# Patient Record
Sex: MALE | Race: WHITE | NOT HISPANIC OR LATINO | Employment: UNEMPLOYED | URBAN - METROPOLITAN AREA
[De-identification: names, ages, dates, MRNs, and addresses within clinical notes are randomized per-mention and may not be internally consistent; named-entity substitution may affect disease eponyms.]

---

## 2018-05-07 ENCOUNTER — OFFICE VISIT (OUTPATIENT)
Dept: URGENT CARE | Facility: URGENT CARE | Age: 38
End: 2018-05-07
Payer: MEDICAID

## 2018-05-07 VITALS
RESPIRATION RATE: 18 BRPM | HEIGHT: 75 IN | SYSTOLIC BLOOD PRESSURE: 120 MMHG | HEART RATE: 66 BPM | TEMPERATURE: 97.6 F | BODY MASS INDEX: 22.85 KG/M2 | DIASTOLIC BLOOD PRESSURE: 78 MMHG | OXYGEN SATURATION: 98 % | WEIGHT: 183.8 LBS

## 2018-05-07 DIAGNOSIS — F42.9 OBSESSIVE-COMPULSIVE DISORDER, UNSPECIFIED TYPE: ICD-10-CM

## 2018-05-07 DIAGNOSIS — F41.9 ANXIETY: ICD-10-CM

## 2018-05-07 DIAGNOSIS — F32.A DEPRESSION, UNSPECIFIED DEPRESSION TYPE: Primary | ICD-10-CM

## 2018-05-07 PROCEDURE — 99214 OFFICE O/P EST MOD 30 MIN: CPT | Performed by: FAMILY MEDICINE

## 2018-05-07 NOTE — MR AVS SNAPSHOT
After Visit Summary   5/7/2018    Sean Guzman    MRN: [id number]           Patient Information     Date Of Birth          1980        Visit Information        Provider Department      5/7/2018 12:55 PM Sean Morel,  [facility]        Today's Diagnoses     Depression, unspecified depression type    -  1    Anxiety        Obsessive-compulsive disorder, unspecified type           Follow-ups after your visit        Additional Services     PSYCHIATRY REFERRAL [facility]       Your provider has referred you to: [facility] for a Primary Care Consultation. Please call [phone] to make an appointment.    Clinic is located at:  [facility], 2nd Floor  [address], [address]  [place] [id number]    Please complete the following questions:    Reason for Referral: depression and anxiety    What specific question is it most critical for you and the patient to have answered?     You have requested a one-time CONSULTATION visit. This means that although we will make recommendations and provide a multi-step plan where appropriate, the Psychiatry Clinic will not provide ongoing care. We expect that as the referring provider, you will continue to see and manage the patient's care primarily, and we will remain available via Epic for any ongoing questions that arise after the initial visit. In rare and unusually complex cases, we may schedule a follow up visit to complete the assessment, but this should also not be seen as taking over the case.    Is this acceptable to you? Yes                  Who to contact     If you have questions or need follow up information about today's clinic visit or your schedule please contact [facility] directly at [phone].  Normal or non-critical lab and imaging results will be communicated to you by MyChart, letter or phone within 4 business days after the  "clinic has received the results. If you do not hear from us within 7 days, please contact the clinic through Backspaces or phone. If you have a critical or abnormal lab result, we will notify you by phone as soon as possible.  Submit refill requests through Backspaces or call your pharmacy and they will forward the refill request to us. Please allow 3 business days for your refill to be completed.          Additional Information About Your Visit        [employer] Information     Backspaces lets you send messages to your doctor, view your test results, renew your prescriptions, schedule appointments and more. To sign up, go to [email or web address] . Click on \"Log in\" on the left side of the screen, which will take you to the Welcome page. Then click on \"Sign up Now\" on the right side of the page.     You will be asked to enter the access code listed below, as well as some personal information. Please follow the directions to create your username and password.     Your access code is: [email or web address]  Expires: 2018  3:06 PM     Your access code will  in 90 days. If you need help or a new code, please call your [facility] clinic or [phone].        Care EveryWhere ID     This is your Care EveryWhere ID. This could be used by other organizations to access your [facility] medical records  [id number]        Your Vitals Were     Pulse Temperature Respirations Height Pulse Oximetry BMI (Body Mass Index)    66 97.6  F (36.4  C) (Oral) 18 6' 3\" (1.905 m) 98% 22.97 kg/m2       Blood Pressure from Last 3 Encounters:   18 120/78   16 102/69    Weight from Last 3 Encounters:   18 183 lb 12.8 oz (83.4 kg)   16 190 lb 8 oz (86.4 kg)              We Performed the Following     PSYCHIATRY REFERRAL [facility]          Today's Medication Changes          These changes are accurate as of 18  3:06 PM.  If you have any questions, ask your nurse or doctor.               Start taking these medicines.        " Dose/Directions    sertraline 50 MG tablet   Commonly known as:  ZOLOFT   Used for:  Depression, unspecified depression type, Anxiety, Obsessive-compulsive disorder, unspecified type        Take 1/2 tablet (25 mg) for 1-2 weeks, then increase to 1 tablet orally daily   Quantity:  30 tablet   Refills:  3            Where to get your medicines      These medications were sent to [facility] [address]  [address], [facility] [address]     Phone:  [phone]     sertraline 50 MG tablet                Primary Care Provider Fax #    Physician No Ref-Primary [phone]       No address on file        Equal Access to Services     [facility] Hospital[facility] : Gisela Breaux, wamaribell padron, qaybta kaalmada markos, kacie gambino . So [facility] [phone].    ATENCIÓN: Si habla español, tiene a gates disposición servicios gratuitos de asistencia lingüística. [facility] [phone].    We comply with applicable federal civil rights laws and [place] laws. We do not discriminate on the basis of race, color, national origin, age, disability, sex, sexual orientation, or gender identity.            Thank you!     Thank you for choosing [facility]  for your care. Our goal is always to provide you with excellent care. Hearing back from our patients is one way we can continue to improve our services. Please take a few minutes to complete the written survey that you may receive in the mail after your visit with us. Thank you!             Your Updated Medication List - Protect others around you: Learn how to safely use, store and throw away your medicines at www.disposemymeds.org.          This list is accurate as of 5/7/18  3:06 PM.  Always use your most recent med list.                   Brand Name Dispense Instructions for use Diagnosis    PROZAC PO           sertraline 50 MG tablet    ZOLOFT    30 tablet    Take 1/2 tablet (25  mg) for 1-2 weeks, then increase to 1 tablet orally daily    Depression, unspecified depression type, Anxiety, Obsessive-compulsive disorder, unspecified type

## 2018-05-10 ENCOUNTER — OFFICE VISIT (OUTPATIENT)
Dept: INTERNAL MEDICINE | Facility: CLINIC | Age: 38
End: 2018-05-10
Payer: MEDICAID

## 2018-05-10 VITALS
TEMPERATURE: 98.2 F | SYSTOLIC BLOOD PRESSURE: 138 MMHG | HEIGHT: 75 IN | DIASTOLIC BLOOD PRESSURE: 60 MMHG | HEART RATE: 70 BPM | WEIGHT: 184 LBS | BODY MASS INDEX: 22.88 KG/M2 | RESPIRATION RATE: 21 BRPM

## 2018-05-10 DIAGNOSIS — F42.9 OBSESSIVE-COMPULSIVE DISORDER, UNSPECIFIED TYPE: Primary | ICD-10-CM

## 2018-05-10 DIAGNOSIS — F32.A ANXIETY AND DEPRESSION: ICD-10-CM

## 2018-05-10 DIAGNOSIS — F41.9 ANXIETY AND DEPRESSION: ICD-10-CM

## 2018-05-10 PROCEDURE — 99214 OFFICE O/P EST MOD 30 MIN: CPT | Performed by: INTERNAL MEDICINE

## 2018-05-10 ASSESSMENT — PAIN SCALES - GENERAL: PAINLEVEL: NO PAIN (0)

## 2018-05-10 NOTE — MR AVS SNAPSHOT
After Visit Summary   5/10/2018    Sean Guzman    MRN: [id number]           Patient Information     Date Of Birth          1980        Visit Information        Provider Department      5/10/2018 2:30 PM Carin Alvarenga MD [facility]        Today's Diagnoses     Obsessive-compulsive disorder, unspecified type    -  1    Anxiety and depression          Care Instructions    Continue Zoloft 25mg daily x 2 weeks then increase to 50mg.    ---    Referrals to psychiatry and therapy provided. They will call you to schedule. Numbers below as well.           Follow-ups after your visit        Additional Services     MENTAL HEALTH REFERRAL  - Adult; Outpatient Treatment; Individual/Couples/Family/Group Therapy/Health Psychology; [facility]: [facility] [phone]; We will contact you to schedule the appointment or please call with any questions       All scheduling is subject to the client's specific insurance plan & benefits, provider/location availability, and provider clinical specialities.  Please arrive 15 minutes early for your first appointment and bring your completed paperwork.    Please be aware that coverage of these services is subject to the terms and limitations of your health insurance plan.  Call member services at your health plan with any benefit or coverage questions.                      MENTAL HEALTH REFERRAL  - Adult; Psychiatry and Medication Management; Psychiatry; [facility]: Psychiatry Clinic [phone]; We will contact you to schedule the appointment or please call with any questions       All scheduling is subject to the client's specific insurance plan & benefits, provider/location availability, and provider clinical specialities.  Please arrive 15 minutes early for your first appointment and bring your completed paperwork.    Please be aware that coverage of these services is subject to the terms and limitations of your health insurance  "plan.  Call member services at your health plan with any benefit or coverage questions.                            Who to contact     If you have questions or need follow up information about today's clinic visit or your schedule please contact [facility] directly at [phone].  Normal or non-critical lab and imaging results will be communicated to you by MyChart, letter or phone within 4 business days after the clinic has received the results. If you do not hear from us within 7 days, please contact the clinic through MyChart or phone. If you have a critical or abnormal lab result, we will notify you by phone as soon as possible.  Submit refill requests through CONWEAVER or call your pharmacy and they will forward the refill request to us. Please allow 3 business days for your refill to be completed.          Additional Information About Your Visit        MyChart Information     CONWEAVER lets you send messages to your doctor, view your test results, renew your prescriptions, schedule appointments and more. To sign up, go to [email or web address] . Click on \"Log in\" on the left side of the screen, which will take you to the Welcome page. Then click on \"Sign up Now\" on the right side of the page.     You will be asked to enter the access code listed below, as well as some personal information. Please follow the directions to create your username and password.     Your access code is: [email or web address]  Expires: 2018  3:06 PM     Your access code will  in 90 days. If you need help or a new code, please call your [facility] or [phone].        Care EveryWhere ID     This is your Care EveryWhere ID. This could be used by other organizations to access your [facility] medical records  [id number]        Your Vitals Were     Pulse Temperature Respirations Height BMI (Body Mass Index)       70 98.2  F (36.8  C) (Oral) 21 6' 3\" (1.905 m) 23 kg/m2        Blood Pressure from Last 3 " Encounters:   05/10/18 138/60   05/07/18 120/78   06/13/16 102/69    Weight from Last 3 Encounters:   05/10/18 184 lb (83.5 kg)   05/07/18 183 lb 12.8 oz (83.4 kg)   06/13/16 190 lb 8 oz (86.4 kg)              We Performed the Following     MENTAL HEALTH REFERRAL  - Adult; Outpatient Treatment; Individual/Couples/Family/Group Therapy/Health Psychology; [facility]: [facility] [phone]; We will contact you to schedule the appointment or please call with any questions     MENTAL HEALTH REFERRAL  - Adult; Psychiatry and Medication Management; Psychiatry; [facility]: Psychiatry Clinic [phone]; We will contact you to schedule the appointment or please call with any questions        Primary Care Provider Fax #    Physician No Ref-Primary [phone]       No address on file        Equal Access to Services     KALPESH GERONIMO : Gisela Breaux, isaac padron, yashira burrows, kacie gambino . So [facility] [phone].    ATENCIÓN: Si habla español, tiene a gates disposición servicios gratuitos de asistencia lingüística. Llame al [phone].    We comply with applicable federal civil rights laws and [place] laws. We do not discriminate on the basis of race, color, national origin, age, disability, sex, sexual orientation, or gender identity.            Thank you!     Thank you for choosing [facility]  for your care. Our goal is always to provide you with excellent care. Hearing back from our patients is one way we can continue to improve our services. Please take a few minutes to complete the written survey that you may receive in the mail after your visit with us. Thank you!             Your Updated Medication List - Protect others around you: Learn how to safely use, store and throw away your medicines at www.disposemymeds.org.          This list is accurate as of 5/10/18  3:01 PM.  Always use your most recent med list.                    Brand Name Dispense Instructions for use Diagnosis    sertraline 50 MG tablet    ZOLOFT    30 tablet    Take 1/2 tablet (25 mg) for 1-2 weeks, then increase to 1 tablet orally daily    Depression, unspecified depression type, Anxiety, Obsessive-compulsive disorder, unspecified type

## 2018-05-10 NOTE — PATIENT INSTRUCTIONS
Continue Zoloft 25mg daily x 2 weeks then increase to 50mg.    ---    Referrals to psychiatry and therapy provided. They will call you to schedule. Numbers below as well.

## 2018-05-11 NOTE — PROGRESS NOTES
"  SUBJECTIVE:                                                      HPI: Sean Guzman is a pleasant 37 year old male who presents with OCD, SURAJ, and MDD:    Accompanied by mother, who is one of our  staff and who assists with history.    History is mildly challenging - patient is at times reticent and nervous and at other times tangential and difficult to interject on and redirect.     Patient has struggled with mood since childhood.     He was on Paxil for many years with good control of symptoms, but this stopped working after awhile.    He was on Prozac for many years too with off and on control of symptoms, but also with off an on side effects. Patient also admits that he self-adjusted his dose regularly (in retrospect he thinks that this may have made his management more challenging).    He has been off of medications for many years now and is struggling. He is a self-proclaimed \"\" and generally shuns medications, but his symptoms seem to be getting worse in spite of his best non-medication efforts.     He has reasonable insight into his struggles. He is not able to live independently, pursue a career path, or manage interpersonal relationships due to his mood symptoms. He is ashamed and embarrassed of his inability to do these things and frequently considers suicide, though not currently suicidal.     Patient was recently started on Zoloft 25mg daily x 2 weeks with an increase to 50mg daily after that. He is very anxious about this medication having read the side effects and researched it. He is not having any side effects currently, but is apprehensive about increasing to 50mg in the near future. Discussed with patient that both 25 and 50mg of Zoloft are considered low doses and that generally much higher doses are needed to help control OCD.     He is interested in meeting with a therapist.    He is interested in meeting with a psychiatrist too - not so much for medication management " "(though he is open to their input) but rather because he thinks he needs an in depth/inpatient assessment to fully understand his daily challenges and mental health struggles.      He has not other known medical problems, but also hasn't seen a PCP in many years.     The medication, allergy, and problem lists have been reviewed and updated as appropriate.       OBJECTIVE:                                                      /60 (BP Location: Left arm, Patient Position: Chair, Cuff Size: Adult Regular)  Pulse 70  Temp 98.2  F (36.8  C) (Oral)  Resp 21  Ht 6' 3\" (1.905 m)  Wt 184 lb (83.5 kg)  BMI 23 kg/m2  Constitutional: well-appearing  Psych: normal judgment and insight; odd affect; recent and remote memory intact      ASSESSMENT/PLAN:                                                      (F42.9) Obsessive-compulsive disorder, unspecified type  (primary encounter diagnosis)  (F41.8) Anxiety and depression  Comment: suspect patient may be also be on the spectrum, though this has never been diagnosed.   Plan:    - continue Zoloft 25mg daily with increase to 50mg daily after 2 weeks.   - referrals placed for both therapy and psychiatry - patient will be contacted to schedule.   - follow-up with me in 4-6 weeks (earlier as needed).     The instructions on the AVS were discussed and explained to the patient. Patient expressed understanding of instructions.    A total of 28 minutes were spent face-to-face with this patient during this encounter and over half of that time was spent on counseling and coordination of care re: above diagnoses and plans of care.     (Chart documentation was completed, in part, with [employer] voice-recognition software. Even though reviewed, some grammatical, spelling, and word errors may remain.)    Carin Alvarenga MD   [address] [address]  T: [phone], F: [phone]    "

## 2018-05-31 NOTE — PROGRESS NOTES
"SUBJECTIVE: Sean Guzman is a 37 year old male presenting with a chief complaint of depression, anxiety and OCD.  Onset of symptoms was week(s) ago.  Course of illness is worsening.    Severity moderate  Current and Associated symptoms: meds in past  Treatment measures tried include None tried currently  Predisposing factors include HX of depression/anxiety.    No past medical history on file.  No Known Allergies  Social History   Substance Use Topics     Smoking status: Former Smoker     Smokeless tobacco: Never Used     Alcohol use No       ROS:  SKIN: no rash  GI: no vomiting    OBJECTIVE:  /78  Pulse 66  Temp 97.6  F (36.4  C) (Oral)  Resp 18  Ht 6' 3\" (1.905 m)  Wt 183 lb 12.8 oz (83.4 kg)  SpO2 98%  BMI 22.97 kg/m2BMTHWRQ APPEARANCE: healthy, alert and no distress  EYES: EOMI,  PERRL, conjunctiva clear  HENT: ear canals and TM's normal.  Nose and mouth without ulcers, erythema or lesions  NECK: supple, nontender, no lymphadenopathy  RESP: lungs clear to auscultation - no rales, rhonchi or wheezes  CV: regular rates and rhythm, normal S1 S2, no murmur noted  NEURO: Normal strength and tone, sensory exam grossly normal,  normal speech and mentation  SKIN: no suspicious lesions or rashes      ICD-10-CM    1. Depression, unspecified depression type F32.9 sertraline (ZOLOFT) 50 MG tablet     PSYCHIATRY REFERRAL [facility]   2. Anxiety F41.9 sertraline (ZOLOFT) 50 MG tablet     PSYCHIATRY REFERRAL [facility]   3. Obsessive-compulsive disorder, unspecified type F42.9 sertraline (ZOLOFT) 50 MG tablet     PSYCHIATRY REFERRAL [facility]       Fluids/Rest, f/u if worse/not any better    "

## 2018-06-21 ENCOUNTER — OFFICE VISIT (OUTPATIENT)
Dept: INTERNAL MEDICINE | Facility: CLINIC | Age: 38
End: 2018-06-21
Payer: MEDICAID

## 2018-06-21 VITALS
TEMPERATURE: 97.9 F | RESPIRATION RATE: 20 BRPM | SYSTOLIC BLOOD PRESSURE: 102 MMHG | BODY MASS INDEX: 23.16 KG/M2 | HEART RATE: 86 BPM | HEIGHT: 75 IN | DIASTOLIC BLOOD PRESSURE: 68 MMHG | WEIGHT: 186.3 LBS

## 2018-06-21 DIAGNOSIS — F42.9 OBSESSIVE-COMPULSIVE DISORDER, UNSPECIFIED TYPE: Primary | ICD-10-CM

## 2018-06-21 DIAGNOSIS — F32.A ANXIETY AND DEPRESSION: ICD-10-CM

## 2018-06-21 DIAGNOSIS — F41.9 ANXIETY AND DEPRESSION: ICD-10-CM

## 2018-06-21 PROCEDURE — 99213 OFFICE O/P EST LOW 20 MIN: CPT | Performed by: INTERNAL MEDICINE

## 2018-06-21 RX ORDER — SERTRALINE HYDROCHLORIDE 100 MG/1
100 TABLET, FILM COATED ORAL DAILY
Qty: 30 TABLET | Refills: 2 | Status: SHIPPED | OUTPATIENT
Start: 2018-06-21 | End: 2018-11-16

## 2018-06-21 ASSESSMENT — ANXIETY QUESTIONNAIRES
7. FEELING AFRAID AS IF SOMETHING AWFUL MIGHT HAPPEN: NOT AT ALL
5. BEING SO RESTLESS THAT IT IS HARD TO SIT STILL: SEVERAL DAYS
2. NOT BEING ABLE TO STOP OR CONTROL WORRYING: SEVERAL DAYS
3. WORRYING TOO MUCH ABOUT DIFFERENT THINGS: SEVERAL DAYS
1. FEELING NERVOUS, ANXIOUS, OR ON EDGE: SEVERAL DAYS
GAD7 TOTAL SCORE: 6
IF YOU CHECKED OFF ANY PROBLEMS ON THIS QUESTIONNAIRE, HOW DIFFICULT HAVE THESE PROBLEMS MADE IT FOR YOU TO DO YOUR WORK, TAKE CARE OF THINGS AT HOME, OR GET ALONG WITH OTHER PEOPLE: VERY DIFFICULT
6. BECOMING EASILY ANNOYED OR IRRITABLE: SEVERAL DAYS

## 2018-06-21 ASSESSMENT — PATIENT HEALTH QUESTIONNAIRE - PHQ9: 5. POOR APPETITE OR OVEREATING: SEVERAL DAYS

## 2018-06-21 NOTE — PROGRESS NOTES
"  SUBJECTIVE:                                                      HPI: Sean Guzman is a pleasant 38 year old male who presents for follow-up:    Was seen 6 weeks ago for debilitating OCD, anxiety, and depression.    Zoloft dose was increased from 25 to 50 mg daily.  Tolerating well no adverse side effects.    Patient is scheduled to meet with therapy next month.      Patient reports significant improvement in OCD, anxiety, depression since increasing the dose.  Admits, though, that he has significant room to improve still.    PHQ 9 and SURAJ 7 scores are 9/27 and 6/21, respectively.    The medication, allergy, and problem lists have been reviewed and updated as appropriate.       OBJECTIVE:                                                      /68  Pulse 86  Temp 97.9  F (36.6  C) (Oral)  Resp 20  Ht 6' 3\" (1.905 m)  Wt 186 lb 4.8 oz (84.5 kg)  BMI 23.29 kg/m2  Constitutional: well-appearing less nervous and agitated the last visit  Psych: normal judgment and insight; normal mood and affect; recent and remote memory intact      ASSESSMENT/PLAN:                                                      (F42.9) Obsessive-compulsive disorder, unspecified type  (primary encounter diagnosis)  (F41.8) Anxiety and depression  Comment: improved on increased dose of Zoloft, but still with room for improvement.  Plan:    - INCREASE Zoloft from 50 to 100 mg daily.   - follow-up in 8 weeks (earlier as needed).     The instructions on the AVS were discussed and explained to the patient. Patient expressed understanding of instructions.    (Chart documentation was completed, in part, with [employer] voice-recognition software. Even though reviewed, some grammatical, spelling, and word errors may remain.)    Carin Alvarenga MD   [address] [address]  T: [phone], F: [phone]    "

## 2018-06-21 NOTE — MR AVS SNAPSHOT
"              After Visit Summary   6/21/2018    Sean Guzman    MRN: [id number]           Patient Information     Date Of Birth          1980        Visit Information        Provider Department      6/21/2018 11:30 AM Carin Alvarenga MD [facility]        Today's Diagnoses     Encounter for medication refill    -  1      Care Instructions    Let's increase Zoloft to 100mg daily.     Follow-up in 8 weeks please (earlier as needed).           Follow-ups after your visit        Your next 10 appointments already scheduled     Jul 16, 2018 11:30 AM CDT   (Arrive by 11:00 AM)   New Visit with LISA Carrillo   [facility]    [address] [address] [phone]              Who to contact     If you have questions or need follow up information about today's clinic visit or your schedule please contact [facility] directly at [phone].  Normal or non-critical lab and imaging results will be communicated to you by MyChart, letter or phone within 4 business days after the clinic has received the results. If you do not hear from us within 7 days, please contact the clinic through MyChart or phone. If you have a critical or abnormal lab result, we will notify you by phone as soon as possible.  Submit refill requests through [employer] or call your pharmacy and they will forward the refill request to us. Please allow 3 business days for your refill to be completed.          Additional Information About Your Visit        Care EveryWhere ID     This is your Care EveryWhere ID. This could be used by other organizations to access your [facility] medical records  [id number]        Your Vitals Were     Pulse Temperature Respirations Height BMI (Body Mass Index)       86 97.9  F (36.6  C) (Oral) 20 6' 3\" (1.905 m) 23.29 kg/m2        Blood Pressure from Last 3 Encounters: "   06/21/18 102/68   05/10/18 138/60   05/07/18 120/78    Weight from Last 3 Encounters:   06/21/18 186 lb 4.8 oz (84.5 kg)   05/10/18 184 lb (83.5 kg)   05/07/18 183 lb 12.8 oz (83.4 kg)              Today, you had the following     No orders found for display         Today's Medication Changes          These changes are accurate as of 6/21/18 11:31 AM.  If you have any questions, ask your nurse or doctor.               Start taking these medicines.        Dose/Directions    sertraline 100 MG tablet   Commonly known as:  ZOLOFT   Used for:  Encounter for medication refill   Started by:  Carin Alvarenga MD        Dose:  100 mg   Take 1 tablet (100 mg) by mouth daily   Quantity:  30 tablet   Refills:  2            Where to get your medicines      These medications were sent to [facility] [address] [address]     Phone:  [phone]     sertraline 100 MG tablet                Primary Care Provider Fax #    Physician No Ref-Primary [phone]       No address on file        Equal Access to Services     RADHA GERONIMO : Hadii yyao acostao Sojana, waaxda luqadaha, qaybta kaalmada adeegyada, kacei gambino . So [facility] [phone].    ATENCIÓN: Si habla español, tiene a gates disposición servicios gratuitos de asistencia lingüística. Llame al [phone].    We comply with applicable federal civil rights laws and [place] laws. We do not discriminate on the basis of race, color, national origin, age, disability, sex, sexual orientation, or gender identity.            Thank you!     Thank you for choosing [facility]  for your care. Our goal is always to provide you with excellent care. Hearing back from our patients is one way we can continue to improve our services. Please take a few minutes to complete the written survey that you may receive in the mail after your visit with us. Thank you!              Your Updated Medication List - Protect others around you: Learn how to safely use, store and throw away your medicines at www.disposemymeds.org.          This list is accurate as of 6/21/18 11:31 AM.  Always use your most recent med list.                   Brand Name Dispense Instructions for use Diagnosis    sertraline 100 MG tablet    ZOLOFT    30 tablet    Take 1 tablet (100 mg) by mouth daily    Encounter for medication refill

## 2018-06-22 ASSESSMENT — ANXIETY QUESTIONNAIRES: GAD7 TOTAL SCORE: 6

## 2018-06-22 ASSESSMENT — PATIENT HEALTH QUESTIONNAIRE - PHQ9: SUM OF ALL RESPONSES TO PHQ QUESTIONS 1-9: 9

## 2018-07-16 ENCOUNTER — OFFICE VISIT (OUTPATIENT)
Dept: BEHAVIORAL HEALTH | Facility: CLINIC | Age: 38
End: 2018-07-16
Attending: INTERNAL MEDICINE
Payer: COMMERCIAL

## 2018-07-16 DIAGNOSIS — F34.1 PERSISTENT DEPRESSIVE DISORDER WITH MIXED FEATURES, CURRENTLY MODERATE: ICD-10-CM

## 2018-07-16 DIAGNOSIS — F42.2 MIXED OBSESSIONAL THOUGHTS AND ACTS: Primary | ICD-10-CM

## 2018-07-16 PROCEDURE — 90791 PSYCH DIAGNOSTIC EVALUATION: CPT | Performed by: SOCIAL WORKER

## 2018-07-16 ASSESSMENT — ANXIETY QUESTIONNAIRES
3. WORRYING TOO MUCH ABOUT DIFFERENT THINGS: SEVERAL DAYS
1. FEELING NERVOUS, ANXIOUS, OR ON EDGE: SEVERAL DAYS
7. FEELING AFRAID AS IF SOMETHING AWFUL MIGHT HAPPEN: SEVERAL DAYS
GAD7 TOTAL SCORE: 7
IF YOU CHECKED OFF ANY PROBLEMS ON THIS QUESTIONNAIRE, HOW DIFFICULT HAVE THESE PROBLEMS MADE IT FOR YOU TO DO YOUR WORK, TAKE CARE OF THINGS AT HOME, OR GET ALONG WITH OTHER PEOPLE: SOMEWHAT DIFFICULT
5. BEING SO RESTLESS THAT IT IS HARD TO SIT STILL: SEVERAL DAYS
6. BECOMING EASILY ANNOYED OR IRRITABLE: SEVERAL DAYS
2. NOT BEING ABLE TO STOP OR CONTROL WORRYING: SEVERAL DAYS

## 2018-07-16 ASSESSMENT — PATIENT HEALTH QUESTIONNAIRE - PHQ9: 5. POOR APPETITE OR OVEREATING: SEVERAL DAYS

## 2018-07-16 NOTE — MR AVS SNAPSHOT
MRN:[id number]                      After Visit Summary   7/16/2018    Sean Guzman    MRN: [id number]           Visit Information        Provider Department      7/16/2018 4:30 PM ValariegurpreetShaylaJeni Erumembervika Weaver [facility] Generic      Your next 10 appointments already scheduled     Jul 24, 2018  8:30 AM CDT   Return Visit with Jeni Erumdemetrio Owen Davila [facility] ([facility])    [address] [place] [address] [id number] [phone]            Aug 16, 2018  1:00 PM CDT   Office Visit with Carin Alvarenga MD   [facility] ([facility])    [address] [place] [address] [id number] [phone]           Bring a current list of meds and any records pertaining to this visit. For Physicals, please bring immunization records and any forms needing to be filled out. Please arrive 10 minutes early to complete paperwork.            Aug 20, 2018  3:30 PM CDT   Return Visit with Jeni Weaver Valariegurpreet [facility] ([facility])    [address] [place] [address] [id number] [phone]              Care EveryWhere ID     This is your Care EveryWhere ID. This could be used by other organizations to access your [facility] medical records  [id number]        Equal Access to Services     KALPESH GERONIMO AH: Hadii aad ku hadasho Soomaali, waaxda luqadaha, qaybta kaalmada adeegyada, kacie levy. So [facility] [phone].    ATENCIÓN: Si habla español, tiene a gates disposición servicios gratuitos de asistencia lingüística. Llame al [phone].    We comply with applicable federal civil rights laws and [place] laws. We do not discriminate on the basis of race, color, national origin, age, disability, sex, sexual orientation, or gender identity.
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
## 2018-07-16 NOTE — Clinical Note
Please be informed that your patient was seen for a diagnostic evaluation on  7/16/18.  The initial DSM-IV diagnosis are OCD, mixed obsessional and compulsive acts and Persistent Depressive Disorder, mixed, moderate.  A follow-up appt is scheduled for 7/24/18 .  The evaluation was completed by MORALES Cheung, LICSOLANGE.

## 2018-07-17 ASSESSMENT — PATIENT HEALTH QUESTIONNAIRE - PHQ9: SUM OF ALL RESPONSES TO PHQ QUESTIONS 1-9: 9

## 2018-07-17 ASSESSMENT — ANXIETY QUESTIONNAIRES: GAD7 TOTAL SCORE: 7

## 2018-07-25 ENCOUNTER — OFFICE VISIT (OUTPATIENT)
Dept: BEHAVIORAL HEALTH | Facility: CLINIC | Age: 38
End: 2018-07-25
Payer: COMMERCIAL

## 2018-07-25 DIAGNOSIS — F42.2 MIXED OBSESSIONAL THOUGHTS AND ACTS: ICD-10-CM

## 2018-07-25 DIAGNOSIS — F41.1 GAD (GENERALIZED ANXIETY DISORDER): ICD-10-CM

## 2018-07-25 DIAGNOSIS — F34.1 PERSISTENT DEPRESSIVE DISORDER WITH MIXED FEATURES, CURRENTLY MODERATE: Primary | ICD-10-CM

## 2018-07-25 PROCEDURE — 90834 PSYTX W PT 45 MINUTES: CPT | Performed by: SOCIAL WORKER

## 2018-07-25 NOTE — PROGRESS NOTES
"                                             Progress Note    Client Name: Sean Guzman  Date: 7/25/2018          Service Type: Individual      Session Start Time: 2:30  Session End Time: 3:30      Session Length: 45     Session #: 2     Attendees: Client attended alone    Treatment Plan Last Reviewed: 7/25/2018   PHQ-9 / SURAJ-7 : 7/16/18 last completed     DATA      Progress Since Last Session (Related to Symptoms / Goals / Homework):   Symptoms: No change reports that levels of OCD/anxiety and depression remain the same    Homework: N/A- discussed treatment goals; client reports that he did get out of the house 5x/ this week      Episode of Care Goals: Satisfactory progress - ACTION (Actively working towards change); Intervened by reinforcing change plan / affirming steps taken     Current / Ongoing Stressors and Concerns:   Client reports that he has been working on getting out of his house.  He brought in his binder and shared more info on his OCD.  Kept reinforcing that this is his diagnosis, but was willing to concede that he does have depression and anxiety as well.  He was asked to described how the OCD shows up in his life- he gave examples of getting up in the morning and trouble with decisionmaking; feeling like he had to fidget with things to get it \"right\" .  Client struggled with identifying thoughts that would get in his way.      Treatment Objective(s) Addressed in This Session:   use cognitive strategies identified in therapy to challenge anxious thoughts       Intervention:   CBT: Introduced cognitive distortions        ASSESSMENT: Current Emotional / Mental Status (status of significant symptoms):   Risk status (Self / Other harm or suicidal ideation)   Client denies current fears or concerns for personal safety.   Client denies current or recent suicidal ideation or behaviors.   Client denies current or recent homicidal ideation or behaviors.   Client denies current or recent self injurious " behavior or ideation.   Client denies other safety concerns.   Client Client reports there has been no change in risk factors since their last session.     Client Client reports there has been no change in protective factors since their last session.     A safety and risk management plan has not been developed at this time, however client was given the after-hours number / 911 should there be a change in any of these risk factors.     Appearance:   Appropriate    Eye Contact:   Poor   Psychomotor Behavior: Restless    Attitude:   Cooperative    Orientation:   All   Speech    Rate / Production: Monotone     Volume:  Normal    Mood:    Anxious    Affect:    Blunted    Thought Content:  Rumination    Thought Form:  Coherent  Logical    Insight:    Poor      Medication Review:   No changes to current psychiatric medication(s)     Medication Compliance:   Yes     Changes in Health Issues:   None reported     Chemical Use Review:   Substance Use: Chemical use reviewed, no active concerns identified      Tobacco Use: No current tobacco use.       Collateral Reports Completed:   Not Applicable    PLAN: (Client Tasks / Therapist Tasks / Other)  1.  Client will read educational material on cognitive distortions and ready to discuss the ones that he engages in.  2.  Call [facility] to be screened for their OCD IOP program.        Jeni Davila, [facility]                                                         ________________________________________________________________________    Treatment Plan    Client's Name: Sean Guzman  YOB: 1980    Date: 7/25/2018     DSM-V Diagnoses: 300.4 (F34.1) Persistent Depressive Disorder, Early onset, 300.02 (F41.1) Generalized Anxiety Disorder or 300.3 (F42) Obsessive Compulsive Disorder  Psychosocial / Contextual Factors: Moderate; Financial, Social, Vocational  WHODAS: 25    Referral / Collaboration:  Was/were discussed and client will  pursue.    Anticipated number of session or this episode of care: 12      MeasurableTreatment Goal(s) related to diagnosis / functional impairment(s)  Goal 1: Client will decrease OCD behaviors as evidenced by self-report and SURAJ-7 scores    I will know I've met my goal when making and decisions and following through, less fidgeting, and not so hard on himself.      Objective #A (Client Action)    Client will use cognitive strategies identified in therapy to challenge anxious thoughts.  Status: New - Date: 7/25/18     Intervention(s)  Therapist will educate client on cognitive distortions and restructuring techniques, then build awareness of them and have client be able to use techniques to defuse from them.    Objective #B  Client will engage in self-care activities and getting out of the house.  Status: New - Date: 7/25/18     Intervention(s)  Therapist will assign tasks and ask client to bring in ideas .    Objective #C  Client will becoming aware of OCD symptoms and learning tools to manage these symptoms.  Status: New - Date: 7/25/18     Intervention(s)  Therapist will assign homework in each session around the OCD diagnosis.      Client has reviewed and agreed to the above plan.      Jeni Davila, [facility]  July 25, 2018

## 2018-07-25 NOTE — MR AVS SNAPSHOT
MRN:[id number]                      After Visit Summary   7/25/2018    Sean Guzman    MRN: [id number]           Visit Information        Provider Department      7/25/2018 2:30 PM Jeni Davila, LISA [facility] Generic      Your next 10 appointments already scheduled     Aug 16, 2018  1:00 PM CDT   Office Visit with Carin Alvarenga MD   [facility] ([facility]    [address] [place] [phone]           Bring a current list of meds and any records pertaining to this visit. For Physicals, please bring immunization records and any forms needing to be filled out. Please arrive 10 minutes early to complete paperwork.            Aug 20, 2018  3:30 PM CDT   Return Visit with LISA Carrillo   [facility] ([facility])    [address] [place] [phone] [phone]              Care EveryWhere ID     This is your Care EveryWhere ID. This could be used by other organizations to access your [place] medical records  [id number]        Equal Access to Services     KALPESH GERONIMO AH: Hadii yayo acostao Sojana, waaxda luqadaha, qaybta kaalmada adejudithyada, kacie levy. So [facility] [phone].    ATENCIÓN: Si habla español, tiene a gates disposición servicios gratuitos de asistencia lingüística. Carlos al [phone].    We comply with applicable federal civil rights laws and [place] laws. We do not discriminate on the basis of race, color, national origin, age, disability, sex, sexual orientation, or gender identity.

## 2018-08-16 ENCOUNTER — OFFICE VISIT (OUTPATIENT)
Dept: INTERNAL MEDICINE | Facility: CLINIC | Age: 38
End: 2018-08-16
Payer: COMMERCIAL

## 2018-08-16 VITALS
OXYGEN SATURATION: 96 % | WEIGHT: 192.5 LBS | DIASTOLIC BLOOD PRESSURE: 72 MMHG | BODY MASS INDEX: 24.06 KG/M2 | HEART RATE: 73 BPM | TEMPERATURE: 97.9 F | SYSTOLIC BLOOD PRESSURE: 104 MMHG | RESPIRATION RATE: 22 BRPM

## 2018-08-16 DIAGNOSIS — F41.9 ANXIETY AND DEPRESSION: ICD-10-CM

## 2018-08-16 DIAGNOSIS — F42.9 OBSESSIVE-COMPULSIVE DISORDER, UNSPECIFIED TYPE: Primary | ICD-10-CM

## 2018-08-16 DIAGNOSIS — F32.A ANXIETY AND DEPRESSION: ICD-10-CM

## 2018-08-16 PROCEDURE — 99213 OFFICE O/P EST LOW 20 MIN: CPT | Performed by: INTERNAL MEDICINE

## 2018-08-16 RX ORDER — SERTRALINE HYDROCHLORIDE 100 MG/1
150 TABLET, FILM COATED ORAL DAILY
Qty: 135 TABLET | Refills: 0 | Status: SHIPPED | OUTPATIENT
Start: 2018-08-16 | End: 2018-11-16

## 2018-08-16 ASSESSMENT — ANXIETY QUESTIONNAIRES
3. WORRYING TOO MUCH ABOUT DIFFERENT THINGS: SEVERAL DAYS
5. BEING SO RESTLESS THAT IT IS HARD TO SIT STILL: SEVERAL DAYS
IF YOU CHECKED OFF ANY PROBLEMS ON THIS QUESTIONNAIRE, HOW DIFFICULT HAVE THESE PROBLEMS MADE IT FOR YOU TO DO YOUR WORK, TAKE CARE OF THINGS AT HOME, OR GET ALONG WITH OTHER PEOPLE: SOMEWHAT DIFFICULT
2. NOT BEING ABLE TO STOP OR CONTROL WORRYING: SEVERAL DAYS
1. FEELING NERVOUS, ANXIOUS, OR ON EDGE: SEVERAL DAYS
GAD7 TOTAL SCORE: 5
6. BECOMING EASILY ANNOYED OR IRRITABLE: NOT AT ALL
7. FEELING AFRAID AS IF SOMETHING AWFUL MIGHT HAPPEN: NOT AT ALL

## 2018-08-16 ASSESSMENT — PATIENT HEALTH QUESTIONNAIRE - PHQ9: 5. POOR APPETITE OR OVEREATING: SEVERAL DAYS

## 2018-08-16 NOTE — PROGRESS NOTES
SUBJECTIVE:                                                      HPI: Sean Guzman is a pleasant 38 year old male who presents with:    Was seen 8 weeks ago for follow-up of debilitating OCD, anxiety, and depression.     Zoloft dose was increased from 50 to 100 mg daily. Tolerating well no adverse side effects.      Patient reports significant improvement in OCD, anxiety, depression since increasing the dose, but still has room to improve. Would like to try an even higher dose.     PHQ-9 and SURAJ-7 scores are 8/27 and  5/21, respectively - minimally improved from last visit.    The medication, allergy, and problem lists have been reviewed and updated as appropriate.       OBJECTIVE:                                                      /72  Pulse 73  Temp 97.9  F (36.6  C) (Oral)  Resp 22  Wt 192 lb 8 oz (87.3 kg)  SpO2 96%  BMI 24.06 kg/m2  Constitutional: well-appearing  Psych: normal judgment and insight; normal mood; recent and remote memory intact      ASSESSMENT/PLAN:                                                      (F42.9) Obsessive-compulsive disorder, unspecified type  (primary encounter diagnosis)  (F41.8) Anxiety and depression  Comment: improved on increased dose of Zoloft, but still with room for improvement.  Plan:    - INCREASE Zoloft from 100 to 150 mg daily.   - follow-up in 8-12 weeks (earlier as needed).     The instructions on the AVS were discussed and explained to the patient. Patient expressed understanding of instructions.    (Chart documentation was completed, in part, with [employer] voice-recognition software. Even though reviewed, some grammatical, spelling, and word errors may remain.)    Carin Alvarenga MD   [address] [address]  T: [phone], F: [phone]

## 2018-08-16 NOTE — PATIENT INSTRUCTIONS
Let's try increasing dose of Zoloft up to 150mg daily - new Rx sent in.     ---    Follow-up in 2-3 months.

## 2018-08-16 NOTE — MR AVS SNAPSHOT
After Visit Summary   8/16/2018    Sean Guzman    MRN: [id number]           Patient Information     Date Of Birth          1980        Visit Information        Provider Department      8/16/2018 1:00 PM Carin Alvarenga MD [facility]        Today's Diagnoses     Obsessive-compulsive disorder, unspecified type    -  1      Care Instructions    Let's try increasing dose of Zoloft up to 150mg daily - new Rx sent in.     ---    Follow-up in 2-3 months.          Follow-ups after your visit        Your next 10 appointments already scheduled     Aug 20, 2018  3:30 PM CDT   Return Visit with Jeni Davila [facility] ([facility])    [address] [address] [phone]              Who to contact     If you have questions or need follow up information about today's clinic visit or your schedule please contact [facility] directly at [phone].  Normal or non-critical lab and imaging results will be communicated to you by MyChart, letter or phone within 4 business days after the clinic has received the results. If you do not hear from us within 7 days, please contact the clinic through MyChart or phone. If you have a critical or abnormal lab result, we will notify you by phone as soon as possible.  Submit refill requests through [employer] or call your pharmacy and they will forward the refill request to us. Please allow 3 business days for your refill to be completed.          Additional Information About Your Visit        Care EveryWhere ID     This is your Care EveryWhere ID. This could be used by other organizations to access your [place] medical records  [id number]        Your Vitals Were     Pulse Temperature Respirations Pulse Oximetry BMI (Body Mass Index)       73 97.9  F (36.6  C) (Oral) 22 96% 24.06 kg/m2        Blood Pressure from Last 3  Encounters:   08/16/18 104/72   06/21/18 102/68   05/10/18 138/60    Weight from Last 3 Encounters:   08/16/18 192 lb 8 oz (87.3 kg)   06/21/18 186 lb 4.8 oz (84.5 kg)   05/10/18 184 lb (83.5 kg)              Today, you had the following     No orders found for display         Today's Medication Changes          These changes are accurate as of 8/16/18  1:14 PM.  If you have any questions, ask your nurse or doctor.               These medicines have changed or have updated prescriptions.        Dose/Directions    * sertraline 100 MG tablet   Commonly known as:  ZOLOFT   This may have changed:  Another medication with the same name was added. Make sure you understand how and when to take each.   Used for:  Obsessive-compulsive disorder, unspecified type, Anxiety and depression   Changed by:  Carin Alvarenga MD        Dose:  100 mg   Take 1 tablet (100 mg) by mouth daily   Quantity:  30 tablet   Refills:  2       * sertraline 100 MG tablet   Commonly known as:  ZOLOFT   This may have changed:  You were already taking a medication with the same name, and this prescription was added. Make sure you understand how and when to take each.   Used for:  Obsessive-compulsive disorder, unspecified type   Changed by:  Carin Alvarenga MD        Dose:  150 mg   Take 1.5 tablets (150 mg) by mouth daily   Quantity:  135 tablet   Refills:  0       * Notice:  This list has 2 medication(s) that are the same as other medications prescribed for you. Read the directions carefully, and ask your doctor or other care provider to review them with you.         Where to get your medicines      These medications were sent to [facility] [address] [address]     Phone:  [phone]     sertraline 100 MG tablet                Primary Care Provider Fax #    Physician No Ref-Primary [phone]       No address on file        Equal Access to Services     KALPESH GERONIMO AH:  Hadii yayo sims Sosaminaali, waaxda luqadaha, qaybta kaalestefani burrows, kacie brettlilia levy. So [facility] [phone].    ATENCIÓN: Si gissell chavez, tiene a gates disposición servicios gratuitos de asistencia lingüística. Nicoleame al [phone].    We comply with applicable federal civil rights laws and [place] laws. We do not discriminate on the basis of race, color, national origin, age, disability, sex, sexual orientation, or gender identity.            Thank you!     Thank you for choosing [facility]  for your care. Our goal is always to provide you with excellent care. Hearing back from our patients is one way we can continue to improve our services. Please take a few minutes to complete the written survey that you may receive in the mail after your visit with us. Thank you!             Your Updated Medication List - Protect others around you: Learn how to safely use, store and throw away your medicines at www.disposemymeds.org.          This list is accurate as of 8/16/18  1:14 PM.  Always use your most recent med list.                   Brand Name Dispense Instructions for use Diagnosis    * sertraline 100 MG tablet    ZOLOFT    30 tablet    Take 1 tablet (100 mg) by mouth daily    Obsessive-compulsive disorder, unspecified type, Anxiety and depression       * sertraline 100 MG tablet    ZOLOFT    135 tablet    Take 1.5 tablets (150 mg) by mouth daily    Obsessive-compulsive disorder, unspecified type       * Notice:  This list has 2 medication(s) that are the same as other medications prescribed for you. Read the directions carefully, and ask your doctor or other care provider to review them with you.

## 2018-08-17 ASSESSMENT — PATIENT HEALTH QUESTIONNAIRE - PHQ9: SUM OF ALL RESPONSES TO PHQ QUESTIONS 1-9: 8

## 2018-08-17 ASSESSMENT — ANXIETY QUESTIONNAIRES: GAD7 TOTAL SCORE: 5

## 2018-08-20 ENCOUNTER — OFFICE VISIT (OUTPATIENT)
Dept: BEHAVIORAL HEALTH | Facility: CLINIC | Age: 38
End: 2018-08-20
Payer: COMMERCIAL

## 2018-08-20 DIAGNOSIS — F34.1 PERSISTENT DEPRESSIVE DISORDER WITH MIXED FEATURES, CURRENTLY MODERATE: ICD-10-CM

## 2018-08-20 DIAGNOSIS — F41.1 GAD (GENERALIZED ANXIETY DISORDER): ICD-10-CM

## 2018-08-20 DIAGNOSIS — F42.2 MIXED OBSESSIONAL THOUGHTS AND ACTS: Primary | ICD-10-CM

## 2018-08-20 PROCEDURE — 90834 PSYTX W PT 45 MINUTES: CPT | Performed by: SOCIAL WORKER

## 2018-08-20 ASSESSMENT — ANXIETY QUESTIONNAIRES
6. BECOMING EASILY ANNOYED OR IRRITABLE: NOT AT ALL
5. BEING SO RESTLESS THAT IT IS HARD TO SIT STILL: SEVERAL DAYS
1. FEELING NERVOUS, ANXIOUS, OR ON EDGE: SEVERAL DAYS
IF YOU CHECKED OFF ANY PROBLEMS ON THIS QUESTIONNAIRE, HOW DIFFICULT HAVE THESE PROBLEMS MADE IT FOR YOU TO DO YOUR WORK, TAKE CARE OF THINGS AT HOME, OR GET ALONG WITH OTHER PEOPLE: SOMEWHAT DIFFICULT
GAD7 TOTAL SCORE: 5
2. NOT BEING ABLE TO STOP OR CONTROL WORRYING: SEVERAL DAYS
7. FEELING AFRAID AS IF SOMETHING AWFUL MIGHT HAPPEN: NOT AT ALL
3. WORRYING TOO MUCH ABOUT DIFFERENT THINGS: SEVERAL DAYS

## 2018-08-20 ASSESSMENT — PATIENT HEALTH QUESTIONNAIRE - PHQ9: 5. POOR APPETITE OR OVEREATING: SEVERAL DAYS

## 2018-08-20 NOTE — PROGRESS NOTES
"                                             Progress Note    Client Name: Sean Guzman  Date: 8/20/2018           Service Type: Individual      Session Start Time: 3:30  Session End Time: 3:30      Session Length: 45     Session #: 3     Attendees: Client attended alone    Treatment Plan Last Reviewed: 7/25/2018   PHQ-9 / SURAJ-7 : 8/20/2018 last completed     DATA      Progress Since Last Session (Related to Symptoms / Goals / Homework):   Symptoms: No change reports that levels of OCD/anxiety and depression remain the same    Homework: Partially completed- has not followed through on Cruz or [employer], read through homework- did not bring it in \"because it may rain on it\"      Episode of Care Goals: Satisfactory progress - ACTION (Actively working towards change); Intervened by reinforcing change plan / affirming steps taken     Current / Ongoing Stressors and Concerns:   Client reports on the following distorted thinking processes being present:  Filtering, Mindreading. Control Fallacy, Personalization, Shoulds, Self Blaming, Emotional Reasoning, and Passive Thinking.  Client has been thinking about volunteering at [employer].  Client decided that he will work on updating his resume- we discussed how he would talk about the time he has not been working (2013).  At first client wanted to not have any appointments until he felt that he has made movements with the goals discussed.  However, writer challenged him with his thinking process- was it anxiety stating that he not have appointments so that he did not have the accountability to have things happen.  He did agree and began taking deep breaths as he worked on calming his anxiety in session.      Treatment Objective(s) Addressed in This Session:   use cognitive strategies identified in therapy to challenge anxious thoughts       Intervention:   CBT: Reviewed cognitive distortions; will work on thought record sheet to build awareness        ASSESSMENT: Current Emotional " / Mental Status (status of significant symptoms):   Risk status (Self / Other harm or suicidal ideation)   Client denies current fears or concerns for personal safety.   Client denies current or recent suicidal ideation or behaviors.   Client denies current or recent homicidal ideation or behaviors.   Client denies current or recent self injurious behavior or ideation.   Client denies other safety concerns.   Client Client reports there has been no change in risk factors since their last session.     Client Client reports there has been no change in protective factors since their last session.     A safety and risk management plan has not been developed at this time, however client was given the after-hours number / 911 should there be a change in any of these risk factors.     Appearance:   Appropriate    Eye Contact:   Poor   Psychomotor Behavior: Restless    Attitude:   Cooperative    Orientation:   All   Speech    Rate / Production: Monotone     Volume:  Normal    Mood:    Anxious    Affect:    Blunted    Thought Content:  Rumination    Thought Form:  Coherent  Logical    Insight:    Poor      Medication Review:   Changes to psychiatric medications, see updated Medication List in EPIC.      Medication Compliance:   Yes     Changes in Health Issues:   None reported     Chemical Use Review:   Substance Use: Chemical use reviewed, no active concerns identified      Tobacco Use: No current tobacco use.       Collateral Reports Completed:   Not Applicable    PLAN: (Client Tasks / Therapist Tasks / Other)  1.  Client will build awareness of distorted thinking with thought record sheets;  2.  Look into resources discussed;  3.  Focus on taking steps towards:  Being independent, being employed, helping others, and having own place.        Jeni Davila, LISA                                                         ________________________________________________________________________    Treatment  Plan    Client's Name: Sean Guzman  YOB: 1980    Date: 7/25/2018     DSM-V Diagnoses: 300.4 (F34.1) Persistent Depressive Disorder, Early onset, 300.02 (F41.1) Generalized Anxiety Disorder or 300.3 (F42) Obsessive Compulsive Disorder  Psychosocial / Contextual Factors: Moderate; Financial, Social, Vocational  WHODAS: 25    Referral / Collaboration:  Was/were discussed and client will pursue.    Anticipated number of session or this episode of care: 12      MeasurableTreatment Goal(s) related to diagnosis / functional impairment(s)  Goal 1: Client will decrease OCD behaviors as evidenced by self-report and SURAJ-7 scores    I will know I've met my goal when making and decisions and following through, less fidgeting, and not so hard on himself.      Objective #A (Client Action)    Client will use cognitive strategies identified in therapy to challenge anxious thoughts.  Status: New - Date: 7/25/18     Intervention(s)  Therapist will educate client on cognitive distortions and restructuring techniques, then build awareness of them and have client be able to use techniques to defuse from them.    Objective #B  Client will engage in self-care activities and getting out of the house.  Status: New - Date: 7/25/18     Intervention(s)  Therapist will assign tasks and ask client to bring in ideas .    Objective #C  Client will becoming aware of OCD symptoms and learning tools to manage these symptoms.  Status: New - Date: 7/25/18     Intervention(s)  Therapist will assign homework in each session around the OCD diagnosis.      Client has reviewed and agreed to the above plan.      Jeni Davila, [facility]  July 25, 2018

## 2018-08-20 NOTE — MR AVS SNAPSHOT
MRN:[id number]                      After Visit Summary   8/20/2018    Sean Guzman    MRN: [id number]           Visit Information        Provider Department      8/20/2018 3:30 PM Jeni Davila LICSW [facility] Generic      Your next 10 appointments already scheduled     Sep 24, 2018 12:30 PM CDT   Return Visit with Jeni Davila [facility] ([facility])    [address] [place] [phone]   [phone]            Oct 29, 2018  2:30 PM CDT   Return Visit with Jeni Davila [facility] ([facility])    [address] [place] [address]   [phone]            Nov 16, 2018  3:15 PM CST   Office Visit with Carin Alvarenga MD   [facility] ([facility]    [address] [place] [phone]           Bring a current list of meds and any records pertaining to this visit. For Physicals, please bring immunization records and any forms needing to be filled out. Please arrive 10 minutes early to complete paperwork.              Care EveryWhere ID     This is your Care EveryWhere ID. This could be used by other organizations to access your [place] medical records  [id number]        Equal Access to Services     KALPESH GERONIMO AH: Hadii aad ku hadasho Sosaminaali, waaxda luqadaha, qaybta kaalmada adeegyada, kacie levy. So [facility] [phone].    ATENCIÓN: Si habla español, tiene a gates disposición servicios gratuitos de asistencia lingüística. Llame al [phone].    We comply with applicable federal civil rights laws and [place] laws. We do not discriminate on the basis of race, color, national origin, age, disability, sex, sexual orientation, or gender identity.

## 2018-08-21 ASSESSMENT — PATIENT HEALTH QUESTIONNAIRE - PHQ9: SUM OF ALL RESPONSES TO PHQ QUESTIONS 1-9: 8

## 2018-08-21 ASSESSMENT — ANXIETY QUESTIONNAIRES: GAD7 TOTAL SCORE: 5

## 2018-09-24 ENCOUNTER — OFFICE VISIT (OUTPATIENT)
Dept: BEHAVIORAL HEALTH | Facility: CLINIC | Age: 38
End: 2018-09-24
Payer: COMMERCIAL

## 2018-09-24 DIAGNOSIS — F34.1 PERSISTENT DEPRESSIVE DISORDER WITH MIXED FEATURES, CURRENTLY MODERATE: Primary | ICD-10-CM

## 2018-09-24 DIAGNOSIS — F41.1 GAD (GENERALIZED ANXIETY DISORDER): ICD-10-CM

## 2018-09-24 DIAGNOSIS — F42.2 MIXED OBSESSIONAL THOUGHTS AND ACTS: ICD-10-CM

## 2018-09-24 PROCEDURE — 90834 PSYTX W PT 45 MINUTES: CPT | Performed by: SOCIAL WORKER

## 2018-09-24 ASSESSMENT — ANXIETY QUESTIONNAIRES
IF YOU CHECKED OFF ANY PROBLEMS ON THIS QUESTIONNAIRE, HOW DIFFICULT HAVE THESE PROBLEMS MADE IT FOR YOU TO DO YOUR WORK, TAKE CARE OF THINGS AT HOME, OR GET ALONG WITH OTHER PEOPLE: SOMEWHAT DIFFICULT
GAD7 TOTAL SCORE: 6
1. FEELING NERVOUS, ANXIOUS, OR ON EDGE: SEVERAL DAYS
7. FEELING AFRAID AS IF SOMETHING AWFUL MIGHT HAPPEN: NOT AT ALL
3. WORRYING TOO MUCH ABOUT DIFFERENT THINGS: SEVERAL DAYS
5. BEING SO RESTLESS THAT IT IS HARD TO SIT STILL: SEVERAL DAYS
2. NOT BEING ABLE TO STOP OR CONTROL WORRYING: SEVERAL DAYS
6. BECOMING EASILY ANNOYED OR IRRITABLE: SEVERAL DAYS

## 2018-09-24 ASSESSMENT — PATIENT HEALTH QUESTIONNAIRE - PHQ9: 5. POOR APPETITE OR OVEREATING: SEVERAL DAYS

## 2018-09-24 NOTE — MR AVS SNAPSHOT
MRN:[id number]                      After Visit Summary   9/24/2018    Sean Guzman    MRN: [id number]           Visit Information        Provider Department      9/24/2018 12:30 PM [facility]        Your next 10 appointments already scheduled     Oct 29, 2018  2:30 PM CDT   Return Visit with Jeni Weaver Giovanni [facility] ([facility])    [address] [place] [phone]            Nov 16, 2018  3:15 PM CST   Office Visit with Carin Alvarenga MD   [facility]    [address] [place] [phone]           Bring a current list of meds and any records pertaining to this visit. For Physicals, please bring immunization records and any forms needing to be filled out. Please arrive 10 minutes early to complete paperwork.            Nov 26, 2018  1:30 PM CST   Return Visit with Jeni Weaver Valariegurpreet [facility] ([facility])    [address] [place] [phone]              Care Instructions    Homework:  1.  Client will walk three times a week;  2. Client will attend [facility] group on Wednesday  3. Client will reach out to his two friends in one month.             Care EveryWhere ID     This is your Care EveryWhere ID. This could be used by other organizations to access your [place] medical records  [id number]        Equal Access to Services     RADHA GERONIMO : Hadii aad ku hadasho Soomaali, waaxda luqadaha, qaybta kaalmada adeegyada, waxelaine ramirez haysharin ramiro gambino . So [facility] [phone].    ATENCIÓN: Si habla español, tiene a gates disposición servicios gratuitos de asistencia lingüística. Llame al [phone].    We comply with  applicable federal civil rights laws and [place] laws. We do not discriminate on the basis of race, color, national origin, age, disability, sex, sexual orientation, or gender identity.

## 2018-09-24 NOTE — PROGRESS NOTES
"                                             Progress Note    Client Name: Sean Guzman  Date: 9/24/2018            Service Type: Individual      Session Start Time: 12:30  Session End Time: 1:30      Session Length: 45     Session #: 4     Attendees: Client attended alone    Treatment Plan Last Reviewed: 7/25/2018   PHQ-9 / SURAJ-7 : 9/24/2018  last completed     DATA      Progress Since Last Session (Related to Symptoms / Goals / Homework):   Symptoms: No change SURAJ-7 and PHQ-9 scores remain about the same from last session    Homework: Did not complete- client continues to struggle- inaction is prominent- tries and stops      Episode of Care Goals: No improvement - PREPARATION (Decided to change - considering how); Intervened by negotiating a change plan and determining options / strategies for behavior change, identifying triggers, exploring social supports, and working towards setting a date to begin behavior change- stuck with wanting but not moving with goals     Current / Ongoing Stressors and Concerns:   Client reports that he continues to adjust with his medications.  Switched medications to the morning- \"to see if it helps me with my energy levels\".  Client reports that he tried to exercise for a day or two and stops.  Much of his time is focused on sleeping, watching tv, or being on the internet.  He did not follow through with any of the resources or homework that we discussed.  He states that he feels \"ready\" to go to [facility] and attend the [facility] group.  Denies that he feels uncomfortable and when pressed as to why there is inaction in his life- he reports that he doesn't feel like it. He reports wanting to follow through with goals of living independently, having a job, and such.  Discussed interacting with his social network- he has two friends but he hasn't called them in \"months\".  Encouraged to call his friends.       Treatment Objective(s) Addressed in This Session:   use cognitive " strategies identified in therapy to challenge anxious thoughts       Intervention:   CBT: Challenged beliefs systems- Attitude around his willingness to change and acceptance around uncomfortability        ASSESSMENT: Current Emotional / Mental Status (status of significant symptoms):   Risk status (Self / Other harm or suicidal ideation)   Client denies current fears or concerns for personal safety.   Client denies current or recent suicidal ideation or behaviors. Passive thoughts when he gets frustrated   Client denies current or recent homicidal ideation or behaviors.   Client denies current or recent self injurious behavior or ideation.   Client denies other safety concerns.   Client Client reports there has been no change in risk factors since their last session.     Client Client reports there has been no change in protective factors since their last session.     A safety and risk management plan has not been developed at this time, however client was given the after-hours number / 911 should there be a change in any of these risk factors.     Appearance:   Appropriate    Eye Contact:   Poor   Psychomotor Behavior: Restless    Attitude:   Cooperative    Orientation:   All   Speech    Rate / Production: Monotone     Volume:  Normal    Mood:    Anxious    Affect:    Blunted    Thought Content:  Rumination    Thought Form:  Coherent  Logical    Insight:    Poor      Medication Review:   No changes to current psychiatric medication(s)     Medication Compliance:   Yes     Changes in Health Issues:   None reported     Chemical Use Review:   Substance Use: Chemical use reviewed, no active concerns identified - One drink every once in a while     Tobacco Use: No current tobacco use.       Collateral Reports Completed:   Not Applicable    PLAN: (Client Tasks / Therapist Tasks / Other)  1.  Client will walk three times a week;  2. Client will attend [facility] orientation group on Wednesday  3. Client will reach out to his  two friends in one month.        Jeni Davila, [facility]                                                         ________________________________________________________________________    Treatment Plan    Client's Name: Sean Guzman  YOB: 1980    Date: 7/25/2018     DSM-V Diagnoses: 300.4 (F34.1) Persistent Depressive Disorder, Early onset, 300.02 (F41.1) Generalized Anxiety Disorder or 300.3 (F42) Obsessive Compulsive Disorder  Psychosocial / Contextual Factors: Moderate; Financial, Social, Vocational  WHODAS: 25    Referral / Collaboration:  Was/were discussed and client will pursue.    Anticipated number of session or this episode of care: 12      MeasurableTreatment Goal(s) related to diagnosis / functional impairment(s)  Goal 1: Client will decrease OCD behaviors as evidenced by self-report and SURAJ-7 scores    I will know I've met my goal when making and decisions and following through, less fidgeting, and not so hard on himself.      Objective #A (Client Action)    Client will use cognitive strategies identified in therapy to challenge anxious thoughts.  Status: New - Date: 7/25/18     Intervention(s)  Therapist will educate client on cognitive distortions and restructuring techniques, then build awareness of them and have client be able to use techniques to defuse from them.    Objective #B  Client will engage in self-care activities and getting out of the house.  Status: New - Date: 7/25/18     Intervention(s)  Therapist will assign tasks and ask client to bring in ideas .    Objective #C  Client will becoming aware of OCD symptoms and learning tools to manage these symptoms.  Status: New - Date: 7/25/18     Intervention(s)  Therapist will assign homework in each session around the OCD diagnosis.      Client has reviewed and agreed to the above plan.      Jeni Davila, [facility]  July 25, 2018

## 2018-09-25 ASSESSMENT — PATIENT HEALTH QUESTIONNAIRE - PHQ9: SUM OF ALL RESPONSES TO PHQ QUESTIONS 1-9: 7

## 2018-09-25 ASSESSMENT — ANXIETY QUESTIONNAIRES: GAD7 TOTAL SCORE: 6

## 2018-10-29 ENCOUNTER — OFFICE VISIT (OUTPATIENT)
Dept: BEHAVIORAL HEALTH | Facility: CLINIC | Age: 38
End: 2018-10-29
Payer: COMMERCIAL

## 2018-10-29 DIAGNOSIS — F34.1 PERSISTENT DEPRESSIVE DISORDER WITH MIXED FEATURES, CURRENTLY MODERATE: ICD-10-CM

## 2018-10-29 DIAGNOSIS — F41.1 GAD (GENERALIZED ANXIETY DISORDER): ICD-10-CM

## 2018-10-29 DIAGNOSIS — F42.2 MIXED OBSESSIONAL THOUGHTS AND ACTS: Primary | ICD-10-CM

## 2018-10-29 PROCEDURE — 90834 PSYTX W PT 45 MINUTES: CPT | Performed by: SOCIAL WORKER

## 2018-10-29 ASSESSMENT — PATIENT HEALTH QUESTIONNAIRE - PHQ9
SUM OF ALL RESPONSES TO PHQ QUESTIONS 1-9: 8
5. POOR APPETITE OR OVEREATING: SEVERAL DAYS

## 2018-10-29 ASSESSMENT — ANXIETY QUESTIONNAIRES
3. WORRYING TOO MUCH ABOUT DIFFERENT THINGS: SEVERAL DAYS
7. FEELING AFRAID AS IF SOMETHING AWFUL MIGHT HAPPEN: NOT AT ALL
IF YOU CHECKED OFF ANY PROBLEMS ON THIS QUESTIONNAIRE, HOW DIFFICULT HAVE THESE PROBLEMS MADE IT FOR YOU TO DO YOUR WORK, TAKE CARE OF THINGS AT HOME, OR GET ALONG WITH OTHER PEOPLE: SOMEWHAT DIFFICULT
6. BECOMING EASILY ANNOYED OR IRRITABLE: NOT AT ALL
1. FEELING NERVOUS, ANXIOUS, OR ON EDGE: SEVERAL DAYS
GAD7 TOTAL SCORE: 5
2. NOT BEING ABLE TO STOP OR CONTROL WORRYING: SEVERAL DAYS
5. BEING SO RESTLESS THAT IT IS HARD TO SIT STILL: SEVERAL DAYS

## 2018-10-29 NOTE — PROGRESS NOTES
"                                             Progress Note    Client Name: Sean Guzman  Date: 9/24/2018            Service Type: Individual      Session Start Time: 12:30  Session End Time: 1:30      Session Length: 45     Session #: 5     Attendees: Client attended alone    Treatment Plan Last Reviewed: 10/29/2018    PHQ-9 / SURAJ-7 : 9/24/2018  last completed     DATA      Progress Since Last Session (Related to Symptoms / Goals / Homework):   Symptoms: No change SURAJ-7 and PHQ-9 scores remain about the same - \"it hasn't got worse\"    Homework: Partially completed- called one friend- did not accomplish any of the other tasks      Episode of Care Goals: No improvement - PREPARATION (Decided to change - considering how); Intervened by negotiating a change plan and determining options / strategies for behavior change, identifying triggers, exploring social supports, and working towards setting a date to begin behavior change- struggling with pushing self- reports on little tasks that he is accomplishing     Current / Ongoing Stressors and Concerns:   Client reports that motivation and inaction remain prominent.  Client reports that he has been addicted to new computer game- [employer]- which has been occupying his time.   Discussed with client the lack of motivation and inaction that is currently present in his life. Denies any stressors.  Family remains supportive.       Treatment Objective(s) Addressed in This Session:   use cognitive strategies identified in therapy to challenge anxious thoughts-encouraged him to be more conscious of his thoughts   engage in self-care activities and getting out of the house-  pick one thing- ADLs, going for a walk, and commit to action       Intervention:   CBT: Awareness of thoughts of \"I'll do it later\"  and \"I don't wanna\"        ASSESSMENT: Current Emotional / Mental Status (status of significant symptoms):   Risk status (Self / Other harm or suicidal ideation)   Client " "denies current fears or concerns for personal safety.   Client denies current or recent suicidal ideation or behaviors. Passive thoughts when he gets frustrated with himself   Client denies current or recent homicidal ideation or behaviors.   Client denies current or recent self injurious behavior or ideation.   Client denies other safety concerns.   Client Client reports there has been no change in risk factors since their last session.     Client Client reports there has been no change in protective factors since their last session.     A safety and risk management plan has not been developed at this time, however client was given the after-hours number / 911 should there be a change in any of these risk factors.      Appearance:   Appropriate    Eye Contact:   Poor   Psychomotor Behavior: Normal    Attitude:   Cooperative    Orientation:   All   Speech    Rate / Production: Monotone     Volume:  Normal    Mood:    Anxious    Affect:    Flat    Thought Content:  focused on trying to have others understand    Thought Form:  Coherent  Logical    Insight:    Poor      Medication Review:   No changes to current psychiatric medication(s)     Medication Compliance:   Yes- meets with PMD next month     Changes in Health Issues:   None reported     Chemical Use Review:   Substance Use: Chemical use reviewed, no active concerns identified - \"Nothing major\"     Tobacco Use: No current tobacco use.       Collateral Reports Completed:   Not Applicable    PLAN: (Client Tasks / Therapist Tasks / Other)  1.  Client will walk three times a week;  2. Client will attend [facility] orientation group on this Wednesday 3p,;  3. Client will reach out to his friends.        Jein Davila, LISA                                                         ________________________________________________________________________    Treatment Plan    Client's Name: Sean Guzman  YOB: 1980    Date: 7/25/2018     DSM-V " Diagnoses: 300.4 (F34.1) Persistent Depressive Disorder, Early onset, 300.02 (F41.1) Generalized Anxiety Disorder or 300.3 (F42) Obsessive Compulsive Disorder  Psychosocial / Contextual Factors: Moderate; Financial, Social, Vocational  WHODAS: 25    Referral / Collaboration:  Was/were discussed and client will pursue.    Anticipated number of session or this episode of care: 12      MeasurableTreatment Goal(s) related to diagnosis / functional impairment(s)  Goal 1: Client will decrease OCD behaviors as evidenced by self-report and SURAJ-7 scores    I will know I've met my goal when making and decisions and following through, less fidgeting, and not so hard on himself.      Objective #A (Client Action)    Client will use cognitive strategies identified in therapy to challenge anxious thoughts.  Status: Continued - Date(s): 10/29/2018      Intervention(s)  Therapist will educate client on cognitive distortions and restructuring techniques, then build awareness of them and have client be able to use techniques to defuse from them.    Objective #B  Client will engage in self-care activities and getting out of the house.  Status: Continued - Date(s): 10/29/2018      Intervention(s)  Therapist will assign tasks and ask client to bring in ideas .    Objective #C  Client will becoming aware of OCD symptoms and learning tools to manage these symptoms.  Status: Continued - Date(s): 10/29/2018      Intervention(s)  Therapist will assign homework in each session around the OCD diagnosis.      Client has reviewed and agreed to the above plan.      Jeni Davila, [facility]  July 25, 2018

## 2018-10-29 NOTE — MR AVS SNAPSHOT
MRN:[id number]                      After Visit Summary   10/29/2018    Sean Guzman    MRN: [id number]           Visit Information        Provider Department      10/29/2018 2:30 PM Jeni Davila, LISA [facility]      Your next 10 appointments already scheduled     Nov 16, 2018  3:15 PM CST   [facility]    [address] [place] [id number] [phone]           Bring a current list of meds and any records pertaining to this visit. For Physicals, please bring immunization records and any forms needing to be filled out. Please arrive 10 minutes early to complete paperwork.            Nov 26, 2018  1:30 PM CST   Return Visit with LISA Carrillo   [facility] ([facility]    [address] [place] [address] [phone]              Care EveryWhere ID     This is your Care EveryWhere ID. This could be used by other organizations to access your [place] medical records  [id number]        Equal Access to Services     KALPESH GERONIMO AH: Hadii yayo nicole hadasho Sosaminaali, waaxda luqadaha, qaybta kaalmada adeegyada, kacie levy. So [facility] [phone].    ATENCIÓN: Si habla español, tiene a gates disposición servicios gratuitos de asistencia lingüística. Nicoleame al [phone].    We comply with applicable federal civil rights laws and [place] laws. We do not discriminate on the basis of race, color, national origin, age, disability, sex, sexual orientation, or gender identity.

## 2018-10-30 ASSESSMENT — ANXIETY QUESTIONNAIRES: GAD7 TOTAL SCORE: 5

## 2018-11-16 ENCOUNTER — OFFICE VISIT (OUTPATIENT)
Dept: INTERNAL MEDICINE | Facility: CLINIC | Age: 38
End: 2018-11-16
Payer: COMMERCIAL

## 2018-11-16 VITALS
TEMPERATURE: 98.4 F | BODY MASS INDEX: 24.8 KG/M2 | SYSTOLIC BLOOD PRESSURE: 120 MMHG | WEIGHT: 198.4 LBS | HEART RATE: 89 BPM | OXYGEN SATURATION: 96 % | RESPIRATION RATE: 20 BRPM | DIASTOLIC BLOOD PRESSURE: 66 MMHG

## 2018-11-16 DIAGNOSIS — F42.2 MIXED OBSESSIONAL THOUGHTS AND ACTS: Primary | ICD-10-CM

## 2018-11-16 DIAGNOSIS — Z11.4 SCREENING FOR HUMAN IMMUNODEFICIENCY VIRUS WITHOUT PRESENCE OF RISK FACTORS: ICD-10-CM

## 2018-11-16 DIAGNOSIS — Z13.220 SCREENING FOR CHOLESTEROL LEVEL: ICD-10-CM

## 2018-11-16 DIAGNOSIS — Z13.1 SCREENING FOR DIABETES MELLITUS: ICD-10-CM

## 2018-11-16 DIAGNOSIS — Z23 NEED FOR PROPHYLACTIC VACCINATION AND INOCULATION AGAINST INFLUENZA: ICD-10-CM

## 2018-11-16 PROCEDURE — 99213 OFFICE O/P EST LOW 20 MIN: CPT | Mod: 25 | Performed by: INTERNAL MEDICINE

## 2018-11-16 PROCEDURE — 90686 IIV4 VACC NO PRSV 0.5 ML IM: CPT | Performed by: INTERNAL MEDICINE

## 2018-11-16 PROCEDURE — 90471 IMMUNIZATION ADMIN: CPT | Performed by: INTERNAL MEDICINE

## 2018-11-16 RX ORDER — SERTRALINE HYDROCHLORIDE 100 MG/1
200 TABLET, FILM COATED ORAL DAILY
Qty: 180 TABLET | Refills: 0 | Status: SHIPPED | OUTPATIENT
Start: 2018-11-16 | End: 2019-02-18

## 2018-11-16 NOTE — PROGRESS NOTES

## 2018-11-16 NOTE — PATIENT INSTRUCTIONS
Flu shot today.    ---    INCREASE Zoloft to 200mg daily.    Follow-up in 3 months.    ---    Fasting labs ordered - may get at your convenience.

## 2018-11-16 NOTE — MR AVS SNAPSHOT
After Visit Summary   11/16/2018    Sean Guzman    MRN: [id number]           Patient Information     Date Of Birth          1980        Visit Information        Provider Department      11/16/2018 3:15 PM Carin Alvarenga MD [facility]        Today's Diagnoses     Need for prophylactic vaccination and inoculation against influenza    -  1    Obsessive-compulsive disorder, unspecified type        Screening for cholesterol level        Screening for diabetes mellitus        Screening for human immunodeficiency virus without presence of risk factors          Care Instructions    Flu shot today.    ---    INCREASE Zoloft to 200mg daily.    Follow-up in 3 months.    ---    Fasting labs ordered - may get at your convenience.           Follow-ups after your visit        Your next 10 appointments already scheduled     Nov 26, 2018  1:30 PM CST   Return Visit with LISA Carrillo   [facility] ([facility])    [address] [address] [phone]              Future tests that were ordered for you today     Open Future Orders        Priority Expected Expires Ordered    Comprehensive metabolic panel Routine  11/16/2019 11/16/2018    Lipid Profile Routine  11/16/2019 11/16/2018    HIV Antigen Antibody Combo Routine  11/16/2019 11/16/2018            Who to contact     If you have questions or need follow up information about today's clinic visit or your schedule please contact [facility] directly at [phone].  Normal or non-critical lab and imaging results will be communicated to you by MyChart, letter or phone within 4 business days after the clinic has received the results. If you do not hear from us within 7 days, please contact the clinic through MyChart or phone. If you have a critical or abnormal lab result, we will notify you by phone as soon as  possible.  Submit refill requests through [employer] or call your pharmacy and they will forward the refill request to us. Please allow 3 business days for your refill to be completed.          Additional Information About Your Visit        Care EveryWhere ID     This is your Care EveryWhere ID. This could be used by other organizations to access your [place] medical records  [id number]        Your Vitals Were     Pulse Temperature Respirations Pulse Oximetry BMI (Body Mass Index)       89 98.4  F (36.9  C) (Oral) 20 96% 24.8 kg/m2        Blood Pressure from Last 3 Encounters:   11/16/18 120/66   08/16/18 104/72   06/21/18 102/68    Weight from Last 3 Encounters:   11/16/18 198 lb 6.4 oz (90 kg)   08/16/18 192 lb 8 oz (87.3 kg)   06/21/18 186 lb 4.8 oz (84.5 kg)              We Performed the Following     FLU VACCINE, SPLIT VIRUS, IM (QUADRIVALENT) [43336]- >3 YRS     Vaccine Administration, Initial [62834]          Today's Medication Changes          These changes are accurate as of 11/16/18  3:33 PM.  If you have any questions, ask your nurse or doctor.               These medicines have changed or have updated prescriptions.        Dose/Directions    * sertraline 100 MG tablet   Commonly known as:  ZOLOFT   This may have changed:  Another medication with the same name was added. Make sure you understand how and when to take each.   Used for:  Obsessive-compulsive disorder, unspecified type, Anxiety and depression   Changed by:  Carin Alvarenga MD        Dose:  100 mg   Take 1 tablet (100 mg) by mouth daily   Quantity:  30 tablet   Refills:  2       * sertraline 100 MG tablet   Commonly known as:  ZOLOFT   This may have changed:  Another medication with the same name was added. Make sure you understand how and when to take each.   Used for:  Obsessive-compulsive disorder, unspecified type   Changed by:  Carin Alvarenga MD        Dose:  150 mg   Take 1.5 tablets (150 mg) by mouth daily   Quantity:  135 tablet    Refills:  0       * sertraline 100 MG tablet   Commonly known as:  ZOLOFT   This may have changed:  You were already taking a medication with the same name, and this prescription was added. Make sure you understand how and when to take each.   Used for:  Obsessive-compulsive disorder, unspecified type   Changed by:  Carin Alvarenga MD        Dose:  200 mg   Take 2 tablets (200 mg) by mouth daily   Quantity:  180 tablet   Refills:  0       * Notice:  This list has 3 medication(s) that are the same as other medications prescribed for you. Read the directions carefully, and ask your doctor or other care provider to review them with you.         Where to get your medicines      These medications were sent to [facility] [address] [address]     Phone:  [phone]     sertraline 100 MG tablet                Primary Care Provider Fax #    Physician No Ref-Primary [phone]       No address on file        Equal Access to Services     KALPESH GERONIMO : Hadii aad ku hadasho Soomaali, waaxda luqadaha, qaybta kaalmada adeegyada, kacie gambino . So [facility] [phone].    ATENCIÓN: Si habla español, tiene a gates disposición servicios gratuitos de asistencia lingüística. Llame al [phone].    We comply with applicable federal civil rights laws and [place] laws. We do not discriminate on the basis of race, color, national origin, age, disability, sex, sexual orientation, or gender identity.            Thank you!     Thank you for choosing [facility]  for your care. Our goal is always to provide you with excellent care. Hearing back from our patients is one way we can continue to improve our services. Please take a few minutes to complete the written survey that you may receive in the mail after your visit with us. Thank you!             Your Updated Medication List - Protect others around you: Learn  how to safely use, store and throw away your medicines at www.disposemymeds.org.          This list is accurate as of 11/16/18  3:33 PM.  Always use your most recent med list.                   Brand Name Dispense Instructions for use Diagnosis    * sertraline 100 MG tablet    ZOLOFT    30 tablet    Take 1 tablet (100 mg) by mouth daily    Obsessive-compulsive disorder, unspecified type, Anxiety and depression       * sertraline 100 MG tablet    ZOLOFT    135 tablet    Take 1.5 tablets (150 mg) by mouth daily    Obsessive-compulsive disorder, unspecified type       * sertraline 100 MG tablet    ZOLOFT    180 tablet    Take 2 tablets (200 mg) by mouth daily    Obsessive-compulsive disorder, unspecified type       * Notice:  This list has 3 medication(s) that are the same as other medications prescribed for you. Read the directions carefully, and ask your doctor or other care provider to review them with you.

## 2018-11-26 ENCOUNTER — OFFICE VISIT (OUTPATIENT)
Dept: BEHAVIORAL HEALTH | Facility: CLINIC | Age: 38
End: 2018-11-26
Payer: COMMERCIAL

## 2018-11-26 DIAGNOSIS — F42.2 MIXED OBSESSIONAL THOUGHTS AND ACTS: ICD-10-CM

## 2018-11-26 DIAGNOSIS — F41.1 GAD (GENERALIZED ANXIETY DISORDER): ICD-10-CM

## 2018-11-26 DIAGNOSIS — F34.1 PERSISTENT DEPRESSIVE DISORDER WITH MIXED FEATURES, CURRENTLY MODERATE: Primary | ICD-10-CM

## 2018-11-26 PROCEDURE — 90834 PSYTX W PT 45 MINUTES: CPT | Performed by: SOCIAL WORKER

## 2018-11-26 ASSESSMENT — PATIENT HEALTH QUESTIONNAIRE - PHQ9
5. POOR APPETITE OR OVEREATING: SEVERAL DAYS
SUM OF ALL RESPONSES TO PHQ QUESTIONS 1-9: 7

## 2018-11-26 ASSESSMENT — ANXIETY QUESTIONNAIRES
3. WORRYING TOO MUCH ABOUT DIFFERENT THINGS: SEVERAL DAYS
IF YOU CHECKED OFF ANY PROBLEMS ON THIS QUESTIONNAIRE, HOW DIFFICULT HAVE THESE PROBLEMS MADE IT FOR YOU TO DO YOUR WORK, TAKE CARE OF THINGS AT HOME, OR GET ALONG WITH OTHER PEOPLE: SOMEWHAT DIFFICULT
5. BEING SO RESTLESS THAT IT IS HARD TO SIT STILL: SEVERAL DAYS
1. FEELING NERVOUS, ANXIOUS, OR ON EDGE: SEVERAL DAYS
6. BECOMING EASILY ANNOYED OR IRRITABLE: NOT AT ALL
7. FEELING AFRAID AS IF SOMETHING AWFUL MIGHT HAPPEN: NOT AT ALL
2. NOT BEING ABLE TO STOP OR CONTROL WORRYING: SEVERAL DAYS
GAD7 TOTAL SCORE: 5

## 2018-11-26 NOTE — PROGRESS NOTES
"                                             Progress Note    Client Name: Sean Guzman  Date: 11/26/2018             Service Type: Individual      Session Start Time: 1:30  Session End Time: 2:30      Session Length: 45     Session #: 6     Attendees: Client attended alone    Treatment Plan Last Reviewed: 10/29/2018    PHQ-9 / SURAJ-7 : 11/26/2018 last completed     DATA      Progress Since Last Session (Related to Symptoms / Goals / Homework):   Symptoms: No change remains the same    Homework: Partially completed- cooking, planning on going to [employer], and taking walks      Episode of Care Goals: Minimal progress - ACTION (Actively working towards change); Intervened by reinforcing change plan / affirming steps taken- has a slow process with taking action     Current / Ongoing Stressors and Concerns:   Client reports that he has been experiencing more motivation and desire to follow through on things.  Did call [employer] for the setting up the intake.  He reports that he is planning to attend this week.  Attended his father's Thanksgiving - had high anxiety, but was finally able to manage being the group.   Has been more aware of his thoughts- has been working on not listening to I don't wanna\".      Treatment Objective(s) Addressed in This Session:   use cognitive strategies identified in therapy to challenge anxious thoughts-encouraged him to be more conscious of his thoughts   engage in self-care activities and getting out of the house-  pick one thing- ADLs, going for a walk, and commit to action       Intervention:   DBT: Introduced TIPP skill        ASSESSMENT: Current Emotional / Mental Status (status of significant symptoms):   Risk status (Self / Other harm or suicidal ideation)   Client denies current fears or concerns for personal safety.   Client denies current or recent suicidal ideation or behaviors. Passive thoughts when he gets frustrated with himself   Client denies current or recent homicidal ideation " "or behaviors.   Client denies current or recent self injurious behavior or ideation.   Client denies other safety concerns.   Client Client reports there has been no change in risk factors since their last session.     Client Client reports there has been no change in protective factors since their last session.     A safety and risk management plan has not been developed at this time, however client was given the after-hours number / 911 should there be a change in any of these risk factors.      Appearance:   Appropriate    Eye Contact:   Fair    Psychomotor Behavior: Normal    Attitude:   Cooperative    Orientation:   All   Speech    Rate / Production: Monotone     Volume:  Normal    Mood:    Anxious    Affect:    Subdued    Thought Content:  focused on trying to have others understand    Thought Form:  Coherent  Logical    Insight:    Poor      Medication Review:   Changes to psychiatric medications, see updated Medication List in EPIC.      Medication Compliance:   Yes- met with PMD this month, medication was increased     Changes in Health Issues:   None reported- working on losing weight     Chemical Use Review:   Substance Use: Chemical use reviewed, no active concerns identified - \"Not much\"     Tobacco Use: No current tobacco use.       Collateral Reports Completed:   Not Applicable    PLAN: (Client Tasks / Therapist Tasks / Other)  1.  Client will commit to attending orientation at [facility].   2.  Work on using TIPP skill daily, especially paced breathing and paired body relaxation.      Jeni Davila, LISA                                                         ________________________________________________________________________    Treatment Plan    Client's Name: Sean Guzman  YOB: 1980    Date: 7/25/2018     DSM-V Diagnoses: 300.4 (F34.1) Persistent Depressive Disorder, Early onset, 300.02 (F41.1) Generalized Anxiety Disorder or 300.3 (F42) Obsessive " Compulsive Disorder  Psychosocial / Contextual Factors: Moderate; Financial, Social, Vocational  WHODAS: 25    Referral / Collaboration:  Was/were discussed and client will pursue.    Anticipated number of session or this episode of care: 12      MeasurableTreatment Goal(s) related to diagnosis / functional impairment(s)  Goal 1: Client will decrease OCD behaviors as evidenced by self-report and SURAJ-7 scores    I will know I've met my goal when making and decisions and following through, less fidgeting, and not so hard on himself.      Objective #A (Client Action)    Client will use cognitive strategies identified in therapy to challenge anxious thoughts.  Status: Continued - Date(s): 10/29/2018      Intervention(s)  Therapist will educate client on cognitive distortions and restructuring techniques, then build awareness of them and have client be able to use techniques to defuse from them.    Objective #B  Client will engage in self-care activities and getting out of the house.  Status: Continued - Date(s): 10/29/2018      Intervention(s)  Therapist will assign tasks and ask client to bring in ideas .    Objective #C  Client will becoming aware of OCD symptoms and learning tools to manage these symptoms.  Status: Continued - Date(s): 10/29/2018      Intervention(s)  Therapist will assign homework in each session around the OCD diagnosis.      Client has reviewed and agreed to the above plan.      Jeni Davila, [facility]  July 25, 2018

## 2018-11-26 NOTE — MR AVS SNAPSHOT
MRN:[id number]                      After Visit Summary   11/26/2018    Sean Guzman    MRN: [id number]           Visit Information        Provider Department      11/26/2018 1:30 PM Jeni Davila LICSW [facility]      Your next 10 appointments already scheduled     Dec 19, 2018  1:30 PM CST   Return Visit with LISA Carrillo   [facility] ([facility]    [address] [place] [id number] [phone]            Feb 15, 2019  3:15 PM CST   [facility]in [facility]ga [facility]    [address] [place] [address] [id number] [phone]           Bring a current list of meds and any records pertaining to this visit. For Physicals, please bring immunization records and any forms needing to be filled out. Please arrive 10 minutes early to complete paperwork.              Care EveryWhere ID     This is your Care EveryWhere ID. This could be used by other organizations to access your [place] medical records  [id number]        Equal Access to Services     KALPESH GERONIMO : Hadii yayo sims Sojana, waaxda luqadaha, qaybta kaalmada adejudithyada, kcaie levy. So [facility] [phone].    ATENCIÓN: Si habla español, tiene a gates disposición servicios gratuitos de asistencia lingüística. Carlos al [phone].    We comply with applicable federal civil rights laws and [place] laws. We do not discriminate on the basis of race, color, national origin, age, disability, sex, sexual orientation, or gender identity.

## 2018-11-27 ASSESSMENT — ANXIETY QUESTIONNAIRES: GAD7 TOTAL SCORE: 5

## 2018-12-19 ENCOUNTER — OFFICE VISIT (OUTPATIENT)
Dept: BEHAVIORAL HEALTH | Facility: CLINIC | Age: 38
End: 2018-12-19
Payer: COMMERCIAL

## 2018-12-19 DIAGNOSIS — F34.1 PERSISTENT DEPRESSIVE DISORDER WITH MIXED FEATURES, CURRENTLY MODERATE: Primary | ICD-10-CM

## 2018-12-19 DIAGNOSIS — F41.1 GAD (GENERALIZED ANXIETY DISORDER): ICD-10-CM

## 2018-12-19 DIAGNOSIS — F42.2 MIXED OBSESSIONAL THOUGHTS AND ACTS: ICD-10-CM

## 2018-12-19 PROCEDURE — 90834 PSYTX W PT 45 MINUTES: CPT | Performed by: SOCIAL WORKER

## 2018-12-19 ASSESSMENT — ANXIETY QUESTIONNAIRES
7. FEELING AFRAID AS IF SOMETHING AWFUL MIGHT HAPPEN: SEVERAL DAYS
5. BEING SO RESTLESS THAT IT IS HARD TO SIT STILL: NOT AT ALL
1. FEELING NERVOUS, ANXIOUS, OR ON EDGE: MORE THAN HALF THE DAYS
GAD7 TOTAL SCORE: 4
IF YOU CHECKED OFF ANY PROBLEMS ON THIS QUESTIONNAIRE, HOW DIFFICULT HAVE THESE PROBLEMS MADE IT FOR YOU TO DO YOUR WORK, TAKE CARE OF THINGS AT HOME, OR GET ALONG WITH OTHER PEOPLE: SOMEWHAT DIFFICULT
6. BECOMING EASILY ANNOYED OR IRRITABLE: NOT AT ALL
3. WORRYING TOO MUCH ABOUT DIFFERENT THINGS: NOT AT ALL
2. NOT BEING ABLE TO STOP OR CONTROL WORRYING: SEVERAL DAYS

## 2018-12-19 ASSESSMENT — PATIENT HEALTH QUESTIONNAIRE - PHQ9
5. POOR APPETITE OR OVEREATING: NOT AT ALL
SUM OF ALL RESPONSES TO PHQ QUESTIONS 1-9: 9

## 2018-12-19 NOTE — PROGRESS NOTES
Discharge Summary  Multiple Sessions    Client Name: Sean Guzman MRN#: [id number] YOB: 1980    Discharge Date:   December 19, 2018      Service Type: Individual      Session Start Time: 1:30  Session End Time: 2:25      Session Length: 45 - 50     Session #: 7     Attendees: Client attended alone    Focus of Treatment Objective(s):  Client's presenting concerns included: Anxiety - struggling with some health issues- believes that it is related to anxiety-   Stage of Change at time of Discharge: ACTION (Actively working towards change)    Medication Adherence:  Yes    Chemical Use:  No    Assessment: Current Emotional / Mental Status (status of significant symptoms):    Risk status (Self / Other harm or suicidal ideation)  Client denies current fears or concerns for personal safety.  Client reports the following current or recent suicidal ideation or behaviors: Continues to have thoughts when he is frustrated with himself.  It does not go to plan or intent.  Client denies current or recent homicidal ideation or behaviors.  Client denies current or recent self injurious behavior or ideation.  Client denies other safety concerns.  A safety and risk management plan has not been developed at this time, however client was given the after-hours number should there be a change in any of these risk factors.    Appearance:   Appropriate   Eye Contact:   Fair   Psychomotor Behavior: Normal   Attitude:   Cooperative   Orientation:   All  Speech   Rate / Production: Normal    Volume:  Normal   Mood:    Anxious   Affect:    Flat   Thought Content:  Clear   Thought Form:  Coherent  Logical   Insight:   Fair     DSM5 Diagnoses: (Sustained by DSM5 Criteria Listed Above)  Diagnoses: 300.4 (F34.1) Persistent Depressive Disorder, With intermittent major depressive episodes, without current episode  300.02 (F41.1) Generalized Anxiety Disorder  300.3 (F42) Obsessive Compulsive Disorder; R/O Bipolar  Disorder, Type 2  Psychosocial & Contextual Factors: Moderate-  WHODAS 2.0 (12 item) Score: 22    Reason for Discharge:  Referred to therapy, Client wants to stay here at [facility] for therapy.      Aftercare Plan:  Client will participate in [employer], CSP- goes at least weekly until therapy is set up.      Jeni Davila, LICSW

## 2018-12-19 NOTE — Clinical Note
FYI, Client will be transferring care for behavioral health services to the new provider at [facility].  In the meantime, client agreed to attend services through [facility] in [place] at least once a week.  Thanks for the referral,MORALES Gabriel, [facility] TherapistFaLong Prairie Memorial Hospital and [phone]

## 2018-12-20 ASSESSMENT — ANXIETY QUESTIONNAIRES: GAD7 TOTAL SCORE: 4

## 2019-02-18 ENCOUNTER — OFFICE VISIT (OUTPATIENT)
Dept: INTERNAL MEDICINE | Facility: CLINIC | Age: 39
End: 2019-02-18
Payer: COMMERCIAL

## 2019-02-18 VITALS
DIASTOLIC BLOOD PRESSURE: 66 MMHG | RESPIRATION RATE: 16 BRPM | WEIGHT: 205.9 LBS | SYSTOLIC BLOOD PRESSURE: 108 MMHG | HEART RATE: 82 BPM | BODY MASS INDEX: 25.74 KG/M2 | OXYGEN SATURATION: 96 %

## 2019-02-18 DIAGNOSIS — F42.2 MIXED OBSESSIONAL THOUGHTS AND ACTS: Primary | ICD-10-CM

## 2019-02-18 PROCEDURE — 99213 OFFICE O/P EST LOW 20 MIN: CPT | Performed by: INTERNAL MEDICINE

## 2019-02-18 RX ORDER — SERTRALINE HYDROCHLORIDE 100 MG/1
250 TABLET, FILM COATED ORAL DAILY
Qty: 225 TABLET | Refills: 3 | Status: SHIPPED | OUTPATIENT
Start: 2019-02-18 | End: 2019-05-17

## 2019-02-18 NOTE — PROGRESS NOTES
SUBJECTIVE:                                                      HPI: Sean Guzman is a pleasant 38 year old male who presents for follow-up of OCD:    Zoloft was increased from 150 to 200 mg at last visit 3 months ago. Tolerating well-no adverse side effects.     Patient reports significant improvement in OCD since last visit. Improved sense of well-being, getting out of his home more often, and feeling less self-conscious around others. He has started thinking about looking for a job.    He is interested in trying an even higher dose of Zoloft to see if his symptoms can be even better controlled.    The medication, allergy, and problem lists have been reviewed and updated as appropriate.     OBJECTIVE:                                                      /66   Pulse 82   Resp 16   Wt 93.4 kg (205 lb 14.4 oz)   SpO2 96%   BMI 25.74 kg/m    Constitutional: well-appearing  Psych: normal judgment and insight; anxious mood and fidgety; recent and remote memory intact    ASSESSMENT/PLAN:                                                      (F42.2) Mixed obsessional thoughts and acts  (primary encounter diagnosis)  Comment: improved but, per patient, still room for improvement.  Plan:    - INCREASE Zoloft to 250mg daily.   - follow-up in 3 months (earlier as needed).     The instructions on the AVS were discussed and explained to the patient. Patient expressed understanding of instructions.    (Chart documentation was completed, in part, with [employer] voice-recognition software. Even though reviewed, some grammatical, spelling, and word errors may remain.)    Carin Alvarenga MD   [address] [address]  T: [phone], F: [phone]

## 2019-02-18 NOTE — PATIENT INSTRUCTIONS
Increase Zoloft to 250mg (2.5 tabs) daily.    Follow-up in 2-3 months. We can always increase further if needed.

## 2019-05-17 ENCOUNTER — OFFICE VISIT (OUTPATIENT)
Dept: INTERNAL MEDICINE | Facility: CLINIC | Age: 39
End: 2019-05-17
Payer: COMMERCIAL

## 2019-05-17 VITALS
DIASTOLIC BLOOD PRESSURE: 64 MMHG | HEART RATE: 72 BPM | WEIGHT: 206.2 LBS | RESPIRATION RATE: 18 BRPM | OXYGEN SATURATION: 96 % | BODY MASS INDEX: 25.77 KG/M2 | SYSTOLIC BLOOD PRESSURE: 110 MMHG

## 2019-05-17 DIAGNOSIS — F42.9 OBSESSIVE-COMPULSIVE DISORDER, UNSPECIFIED TYPE: Primary | ICD-10-CM

## 2019-05-17 PROCEDURE — 99213 OFFICE O/P EST LOW 20 MIN: CPT | Performed by: INTERNAL MEDICINE

## 2019-05-17 RX ORDER — SERTRALINE HYDROCHLORIDE 100 MG/1
300 TABLET, FILM COATED ORAL DAILY
Qty: 270 TABLET | Refills: 3 | Status: SHIPPED | OUTPATIENT
Start: 2019-05-17 | End: 2019-07-12

## 2019-05-17 NOTE — PROGRESS NOTES
SUBJECTIVE:                                                      HPI: Sean Guzman is a pleasant 38 year old male who presents for follow-up of OCD:    Zoloft was increased from 200 to 250mg at last visit 3 months ago. Tolerating well-no adverse side effects.      Patient reports some improvement in OCD since last visit. Improved sense of well-being, getting out of his home more often, and feeling less self-conscious around others.      He is interested in trying an even higher dose of Zoloft to see if his symptoms can be even better controlled.     The medication, allergy, and problem lists have been reviewed and updated as appropriate.     OBJECTIVE:                                                      /64   Pulse 72   Resp 18   Wt 93.5 kg (206 lb 3.2 oz)   SpO2 96%   BMI 25.77 kg/m    Constitutional: well-appearing  Psych: normal judgment and insight; anxious mood and fidgety; recent and remote memory intact    ASSESSMENT/PLAN:                                                      (F42.9) Obsessive-compulsive disorder, unspecified type  (primary encounter diagnosis)  Comment: improved but, per patient, still room for improvement  Plan:   - INCREASE Zoloft to 300mg daily (max dose 400mg).   - follow-up in 3 months (earlier as needed).     The instructions on the AVS were discussed and explained to the patient. Patient expressed understanding of instructions.    (Chart documentation was completed, in part, with [employer] voice-recognition software. Even though reviewed, some grammatical, spelling, and word errors may remain.)    Carin Alvarenga MD   [address] [address]  T: [phone], F: [phone]

## 2019-05-18 ENCOUNTER — TELEPHONE (OUTPATIENT)
Dept: INTERNAL MEDICINE | Facility: CLINIC | Age: 39
End: 2019-05-18

## 2019-05-20 NOTE — TELEPHONE ENCOUNTER
Prior Authorization Retail Medication Request    Medication/Dose: Sertraline  ICD code (if different than what is on RX):    Previously Tried and Failed:    Rationale:      Insurance Name:  [facility]  Insurance ID:  [id number]      Insurance is only allowing maximum of 2 tablets per day. Please initiate request and let the pharmacy know when it has been approved or denied.    Thank you!    Liliana Au,[facility]

## 2019-05-21 NOTE — TELEPHONE ENCOUNTER
Central Prior Authorization Team   Phone: [phone]    PA Initiation    Medication: Sertraline-Initiated  Insurance Company: [employer] - Phone [phone] Fax [phone]  Pharmacy Filling the Rx: [place], [place] - [address] [place] [address]  Filling Pharmacy Phone: [phone]  Filling Pharmacy Fax:    Start Date: 5/21/2019

## 2019-05-22 NOTE — TELEPHONE ENCOUNTER
Prior Authorization Approval    Authorization Effective Date: 5/20/2019  Authorization Expiration Date: 5/20/2020  Medication: Sertraline-APPROVED  Approved Dose/Quantity:   Reference #:     Insurance Company: [employer] - Phone [phone] Fax [phone]  Expected CoPay:       CoPay Card Available:      Foundation Assistance Needed:    Which Pharmacy is filling the prescription (Not needed for infusion/clinic administered): [facility] [place], [place] - [address] [place] [address]  Pharmacy Notified: Yes  Patient Notified: No    Pharmacy will notify patient when medication is ready.

## 2019-06-25 NOTE — PATIENT INSTRUCTIONS
Homework:  1.  Client will walk three times a week;  2. Client will attend [facility] orientation group on Wednesday  3. Client will reach out to his two friends in one month.       MD FYI  Please update vaccine reported.

## 2019-07-10 DIAGNOSIS — Z13.1 SCREENING FOR DIABETES MELLITUS: ICD-10-CM

## 2019-07-10 DIAGNOSIS — Z11.4 SCREENING FOR HUMAN IMMUNODEFICIENCY VIRUS WITHOUT PRESENCE OF RISK FACTORS: ICD-10-CM

## 2019-07-10 DIAGNOSIS — Z13.220 SCREENING FOR CHOLESTEROL LEVEL: ICD-10-CM

## 2019-07-10 PROCEDURE — 87389 HIV-1 AG W/HIV-1&-2 AB AG IA: CPT | Performed by: INTERNAL MEDICINE

## 2019-07-10 PROCEDURE — 80053 COMPREHEN METABOLIC PANEL: CPT | Performed by: INTERNAL MEDICINE

## 2019-07-10 PROCEDURE — 36415 COLL VENOUS BLD VENIPUNCTURE: CPT | Performed by: INTERNAL MEDICINE

## 2019-07-10 PROCEDURE — 80061 LIPID PANEL: CPT | Performed by: INTERNAL MEDICINE

## 2019-07-11 LAB
ALBUMIN SERPL-MCNC: 4 G/DL (ref 3.4–5)
ALP SERPL-CCNC: 99 U/L (ref 40–150)
ALT SERPL W P-5'-P-CCNC: 42 U/L (ref 0–70)
ANION GAP SERPL CALCULATED.3IONS-SCNC: 5 MMOL/L (ref 3–14)
AST SERPL W P-5'-P-CCNC: 31 U/L (ref 0–45)
BILIRUB SERPL-MCNC: 0.4 MG/DL (ref 0.2–1.3)
BUN SERPL-MCNC: 13 MG/DL (ref 7–30)
CALCIUM SERPL-MCNC: 9 MG/DL (ref 8.5–10.1)
CHLORIDE SERPL-SCNC: 103 MMOL/L (ref 94–109)
CHOLEST SERPL-MCNC: 209 MG/DL
CO2 SERPL-SCNC: 32 MMOL/L (ref 20–32)
CREAT SERPL-MCNC: 0.95 MG/DL (ref 0.66–1.25)
GFR SERPL CREATININE-BSD FRML MDRD: >90 ML/MIN/{1.73_M2}
GLUCOSE SERPL-MCNC: 80 MG/DL (ref 70–99)
HDLC SERPL-MCNC: 45 MG/DL
HIV 1+2 AB+HIV1 P24 AG SERPL QL IA: NONREACTIVE
LDLC SERPL CALC-MCNC: 137 MG/DL
NONHDLC SERPL-MCNC: 164 MG/DL
POTASSIUM SERPL-SCNC: 4.2 MMOL/L (ref 3.4–5.3)
PROT SERPL-MCNC: 7.2 G/DL (ref 6.8–8.8)
SODIUM SERPL-SCNC: 140 MMOL/L (ref 133–144)
TRIGL SERPL-MCNC: 137 MG/DL

## 2019-07-12 ENCOUNTER — OFFICE VISIT (OUTPATIENT)
Dept: INTERNAL MEDICINE | Facility: CLINIC | Age: 39
End: 2019-07-12
Payer: COMMERCIAL

## 2019-07-12 VITALS
HEART RATE: 67 BPM | SYSTOLIC BLOOD PRESSURE: 108 MMHG | WEIGHT: 207.7 LBS | BODY MASS INDEX: 25.96 KG/M2 | OXYGEN SATURATION: 97 % | DIASTOLIC BLOOD PRESSURE: 74 MMHG | RESPIRATION RATE: 18 BRPM

## 2019-07-12 DIAGNOSIS — F42.9 OBSESSIVE-COMPULSIVE DISORDER, UNSPECIFIED TYPE: Primary | ICD-10-CM

## 2019-07-12 PROCEDURE — 99213 OFFICE O/P EST LOW 20 MIN: CPT | Performed by: INTERNAL MEDICINE

## 2019-07-12 RX ORDER — SERTRALINE HYDROCHLORIDE 100 MG/1
300 TABLET, FILM COATED ORAL DAILY
Qty: 270 TABLET | Refills: 0 | Status: SHIPPED | OUTPATIENT
Start: 2019-07-12 | End: 2019-10-11

## 2019-07-12 NOTE — PROGRESS NOTES
SUBJECTIVE:                                                      HPI: Sean Guzman is a pleasant 39 year old male who presents for follow-up of OCD:    Zoloft was increased from 250 to 300mg at last visit 3 months ago. Tolerating well-no adverse side effects.      Patient reports some improvement in OCD since last visit. Improved sense of well-being, getting out of his home more often, and feeling less self-conscious around others.      He is interested in trying an even higher dose of Zoloft to see if his symptoms can be even better controlled.    The medication, allergy, and problem lists have been reviewed and updated as appropriate.     OBJECTIVE:                                                      /74   Pulse 67   Resp 18   Wt 94.2 kg (207 lb 11.2 oz)   SpO2 97%   BMI 25.96 kg/m    Constitutional: well-appearing  Psych: normal judgment and insight; anxious mood and fidgety; recent and remote memory intact    ASSESSMENT/PLAN:                                                      (F42.9) Obsessive-compulsive disorder, unspecified type  (primary encounter diagnosis)  Comment: improved but, per patient, still room for improvement.  Plan:                - INCREASE Zoloft to 350mg daily (max dose 400mg).                - follow-up in 3 months (earlier as needed).     The instructions on the AVS were discussed and explained to the patient. Patient expressed understanding of instructions.    (Chart documentation was completed, in part, with [employer] voice-recognition software. Even though reviewed, some grammatical, spelling, and word errors may remain.)    Carin Alvarenga MD   [address] [address]  T: [phone], F: [phone]

## 2019-09-26 ENCOUNTER — TELEPHONE (OUTPATIENT)
Dept: INTERNAL MEDICINE | Facility: CLINIC | Age: 39
End: 2019-09-26

## 2019-09-26 NOTE — TELEPHONE ENCOUNTER
Prior Authorization Retail Medication Request    Medication/Dose: sertraline  ICD code (if different than what is on RX):   Previously Tried and Failed:   Rationale:      Insurance Name: [employer]   Insurance ID:  [id number]     attached is the rejection from insurance   MX DOSE/DAY= 2.00 OVR/DR CARO    Pharmacy Information (if different than what is on RX)  Name: [facility] pharmacy  Phone:  [phone]

## 2019-10-11 ENCOUNTER — OFFICE VISIT (OUTPATIENT)
Dept: INTERNAL MEDICINE | Facility: CLINIC | Age: 39
End: 2019-10-11
Payer: COMMERCIAL

## 2019-10-11 VITALS
BODY MASS INDEX: 25.8 KG/M2 | SYSTOLIC BLOOD PRESSURE: 112 MMHG | HEART RATE: 83 BPM | RESPIRATION RATE: 16 BRPM | WEIGHT: 206.4 LBS | OXYGEN SATURATION: 96 % | DIASTOLIC BLOOD PRESSURE: 64 MMHG

## 2019-10-11 DIAGNOSIS — F42.9 OBSESSIVE-COMPULSIVE DISORDER, UNSPECIFIED TYPE: Primary | ICD-10-CM

## 2019-10-11 DIAGNOSIS — Z23 NEED FOR INFLUENZA VACCINATION: ICD-10-CM

## 2019-10-11 PROCEDURE — 90471 IMMUNIZATION ADMIN: CPT | Performed by: INTERNAL MEDICINE

## 2019-10-11 PROCEDURE — 90686 IIV4 VACC NO PRSV 0.5 ML IM: CPT | Performed by: INTERNAL MEDICINE

## 2019-10-11 PROCEDURE — 99214 OFFICE O/P EST MOD 30 MIN: CPT | Mod: 25 | Performed by: INTERNAL MEDICINE

## 2019-10-11 RX ORDER — SERTRALINE HYDROCHLORIDE 100 MG/1
200 TABLET, FILM COATED ORAL DAILY
Qty: 180 TABLET | Refills: 3 | Status: SHIPPED | OUTPATIENT
Start: 2019-10-11 | End: 2019-10-18

## 2019-10-11 NOTE — PROGRESS NOTES
SUBJECTIVE:                                                      HPI: Sean Guzman is a pleasant 39 year old male who presents for follow-up of OCD:     Zoloft was increased from 300 to 350mg at last visit 3 months ago. Tolerating well - no adverse side effects.      Patient reports some improvement in OCD since last visit. Improved sense of well-being, getting out of his home more often, and feeling less self-conscious around others.      He is interested in trying an even higher dose of Zoloft to see if his symptoms can be even better controlled.    The medication, allergy, and problem lists have been reviewed and updated as appropriate.     OBJECTIVE:                                                      /64   Pulse 83   Resp 16   Wt 93.6 kg (206 lb 6.4 oz)   SpO2 96%   BMI 25.80 kg/m    Constitutional: well-appearing  Psych: normal judgment and insight; anxious mood and fidgety; recent and remote memory intact    ASSESSMENT/PLAN:                                                      (F42.9) Obsessive-compulsive disorder, unspecified type  (primary encounter diagnosis)  Comment: improved but, per patient, still room for improvement.  Plan:   - INCREASE Zoloft to 400mg daily (max dose).   - follow-up as needed (1 year if not sooner).     (Z23) Need for influenza vaccination  Plan: flu shot given today.     The instructions on the AVS were discussed and explained to the patient. Patient expressed understanding of instructions.    (Chart documentation was completed, in part, with [employer] voice-recognition software. Even though reviewed, some grammatical, spelling, and word errors may remain.)    Carin Alvarenga MD   [address] [address]  T: [phone], F: [phone]

## 2019-10-25 ENCOUNTER — TELEPHONE (OUTPATIENT)
Dept: INTERNAL MEDICINE | Facility: CLINIC | Age: 39
End: 2019-10-25

## 2019-10-25 NOTE — TELEPHONE ENCOUNTER
PA is required for more than 2 tablets per day    Prior Authorization Specialty Medication Request    Medication/Dose: Sertraline 100mg  ICD code (if different than what is on RX):  f429  Previously Tried and Failed:  N/a    Insurance Name: [employer]  Insurance ID: [id number]  Insurance Phone Number: [phone]    Pharmacy Information (if different than what is on RX)  Name:  [facility]   Phone:  [phone]    Krista Quiñonez, Pharmacy Technician  [facility]  [phone]

## 2019-10-28 NOTE — TELEPHONE ENCOUNTER
Central Prior Authorization Team   Phone: [phone]    PA Initiation    Medication: Sertraline 100mg  Insurance Company: [facility] - Phone [phone] Fax [phone]  Pharmacy Filling the Rx: [place], [place] - [address] [place] [address]  Filling Pharmacy Phone: [phone]  Filling Pharmacy Fax: [phone]  Start Date: 10/28/2019    INITIATED VIA PHONE [phone].

## 2019-10-28 NOTE — TELEPHONE ENCOUNTER
Prior Authorization Approval    Authorization Effective Date: 10/27/2019  Authorization Expiration Date: 10/27/2020  Medication: Sertraline 100mg-APPROVED  Approved Dose/Quantity:    Reference #: [id number]   Insurance Company: [employer] - Phone [phone] Fax [phone]  Expected CoPay:       CoPay Card Available:      Foundation Assistance Needed:    Which Pharmacy is filling the prescription (Not needed for infusion/clinic administered): [facility] [place], [place] - [address] [place] [address]  Pharmacy Notified: Yes  Patient Notified: Yes

## 2020-05-13 ENCOUNTER — TELEPHONE (OUTPATIENT)
Dept: INTERNAL MEDICINE | Facility: CLINIC | Age: 40
End: 2020-05-13

## 2020-05-13 NOTE — TELEPHONE ENCOUNTER
Central Prior Authorization Team  Phone: [phone]    PA Initiation    Medication: Sertraline  Insurance Company: [employer] - Phone [phone] Fax [phone]  Pharmacy Filling the Rx: [place], [place] - [address] [place] [address]  Filling Pharmacy Phone: [phone]  Filling Pharmacy Fax:    Start Date: 5/13/2020

## 2020-05-13 NOTE — TELEPHONE ENCOUNTER
Prior Authorization Approval    Authorization Effective Date: 5/12/2020  Authorization Expiration Date: 5/13/2021  Medication: Sertraline- APPROVED   Approved Dose/Quantity:    Reference #:     Insurance Company: [employer] - Phone [phone] Fax [phone]  Expected CoPay:       CoPay Card Available:      Foundation Assistance Needed:    Which Pharmacy is filling the prescription (Not needed for infusion/clinic administered): [facility] [address]  Pharmacy Notified: Yes  Patient Notified: Comment:  **Instructed pharmacy to notify patient when script is ready to /ship.**

## 2020-05-13 NOTE — TELEPHONE ENCOUNTER
Prior Authorization Retail Medication Request    Medication/Dose: Sertraline  ICD code (if different than what is on RX):  F42.9  Previously Tried and Failed:    Rationale:  Insurance has max of 2 tablets per day    Insurance Name:  [facility]  Insurance ID:  [id number]      Pharmacy Information (if different than what is on RX)  Name:  [facility]  Phone:  [phone]    Demarcus Ley, PharmD  [facility]  [phone]

## 2020-10-09 ENCOUNTER — OFFICE VISIT (OUTPATIENT)
Dept: INTERNAL MEDICINE | Facility: CLINIC | Age: 40
End: 2020-10-09
Payer: COMMERCIAL

## 2020-10-09 VITALS
DIASTOLIC BLOOD PRESSURE: 74 MMHG | SYSTOLIC BLOOD PRESSURE: 112 MMHG | OXYGEN SATURATION: 98 % | RESPIRATION RATE: 18 BRPM | TEMPERATURE: 97 F | HEIGHT: 75 IN | WEIGHT: 209.1 LBS | BODY MASS INDEX: 26 KG/M2 | HEART RATE: 82 BPM

## 2020-10-09 DIAGNOSIS — Z23 NEED FOR PROPHYLACTIC VACCINATION AND INOCULATION AGAINST INFLUENZA: ICD-10-CM

## 2020-10-09 DIAGNOSIS — Z00.00 ROUTINE HISTORY AND PHYSICAL EXAMINATION OF ADULT: Primary | ICD-10-CM

## 2020-10-09 DIAGNOSIS — F42.9 OBSESSIVE-COMPULSIVE DISORDER, UNSPECIFIED TYPE: ICD-10-CM

## 2020-10-09 PROCEDURE — 99396 PREV VISIT EST AGE 40-64: CPT | Mod: 25 | Performed by: INTERNAL MEDICINE

## 2020-10-09 PROCEDURE — 90686 IIV4 VACC NO PRSV 0.5 ML IM: CPT | Performed by: INTERNAL MEDICINE

## 2020-10-09 PROCEDURE — 90471 IMMUNIZATION ADMIN: CPT | Performed by: INTERNAL MEDICINE

## 2020-10-09 RX ORDER — SERTRALINE HYDROCHLORIDE 100 MG/1
350 TABLET, FILM COATED ORAL DAILY
Qty: 315 TABLET | Refills: 3 | Status: SHIPPED | OUTPATIENT
Start: 2020-10-09 | End: 2021-10-11

## 2020-10-09 ASSESSMENT — MIFFLIN-ST. JEOR: SCORE: 1939.1

## 2020-10-09 NOTE — PROGRESS NOTES
SUBJECTIVE                                                      HPI: Sean Guzman is a very pleasant 40 year old male who presents for a physical.    ROS:  Constitutional: no unintentional weight loss or gain reported; no fevers, chills, or sweats  reported    Cardiovascular: no chest pain, palpitations, or edema reported  Respiratory: no cough, wheezing, shortness of breath, or dyspnea on exertion reported  Gastrointestinal: no nausea, vomiting, constipation, diarrhea, or abdominal pain reported  Genitourinary: no urinary frequency, urgency, dysuria, or hematuria reported  Integumentary: no rash or pruritus reported  Musculoskeletal: no back pain, muscle pain, joint pain, or joint swelling reported  Neurologic: no focal weakness, numbness, or tingling reported  Hematologic: no easy bruising or bleeding reported  Endocrine: no heat or cold intolerance reported; no polyuria or polydipsia reported  Psychiatric: see PMH below    Past Medical History:   Diagnosis Date     OCD (obsessive compulsive disorder)      Past Surgical History:   Procedure Laterality Date     NO HISTORY OF SURGERY       Family History   Problem Relation Age of Onset     Hypertension Father      Hyperlipidemia Father      Diabetes Type 2  Maternal Grandmother      Schizophrenia Maternal Grandmother      Chronic Obstructive Pulmonary Disease Maternal Grandfather      Leukemia Paternal Grandmother      Impaired Fasting Glucose Paternal Grandfather      Bladder Cancer Paternal Grandfather         smoker     Prostate Cancer Paternal Grandfather         ?     Colon Cancer Paternal Grandfather         ?     Myocardial Infarction No family hx of      Coronary Artery Disease Early Onset No family hx of      Cerebrovascular Disease No family hx of      Social History     Tobacco Use     Smoking status: Former Smoker     Packs/day: 0.25     Years: 10.00     Pack years: 2.50     Quit date: 1/1/2017     Years since quitting: 3.7     Smokeless tobacco:  "Never Used   Substance and Sexual Activity     Alcohol use: No     Drug use: No     Sexual activity: Not Currently   Social History Narrative    Single.    Occasional exercise.      No Known Allergies     Current Outpatient Medications   Medication Sig     sertraline (ZOLOFT) 100 MG tablet Take 3.5 tablets (350 mg) by mouth daily     Immunization History   Administered Date(s) Administered     Influenza Vaccine IM > 6 months Valent IIV4 11/16/2018, 10/11/2019     TDAP Vaccine (Adacel) 11/03/2015     OBJECTIVE                                                      /74   Pulse 82   Temp 97  F (36.1  C) (Temporal)   Resp 18   Ht 1.905 m (6' 3\")   Wt 94.8 kg (209 lb 1.6 oz)   SpO2 98%   BMI 26.14 kg/m    Constitutional: well-appearing  Eyes: normal conjunctivae and lids  Head, Ears, and Eyes: normocephalic; normal external auditory canal and pinna; tympanic membranes visualized and normal; normal lids and conjunctivae  Neck: supple, symmetric, no thyromegaly or lymphadenopathy  Respiratory: normal respiratory effort; clear to auscultation bilaterally  Cardiovascular: regular rate and rhythm; no edema  Gastrointestinal: soft, non-tender, and non-distended; no organomegaly or masses  Musculoskeletal: normal gait and station  Psych: normal judgment and insight; normal mood and affect; recent and remote memory intact    PREVENTATIVE HEALTH                                                      BMI: overweight  Blood pressure: within normal limits   Prostate CA Screening: not medically indicated at this time   Colon CA screening: not medically indicated at this time   Lung CA screening: not medically indicated at this time   AAA screening: not medically indicated at this time   Screening HCV: n/a   Screening HIV: completed  Screening cholesterol: up to date   Screening diabetes: up to date   STD testing: no risk factors present  Alcohol misuse screening: alcohol use reviewed - no intervention indicated at this " time  Immunizations: reviewed; flu shot DUE    ASSESSMENT/PLAN                                                       (Z00.00) Routine history and physical examination of adult  (primary encounter diagnosis)  Comment: PMH, PSH, FH, SH, medications, allergies, immunizations, and preventative health measures reviewed and updated as appropriate.  Plan: see below for plans.      (Z23) Need for prophylactic vaccination and inoculation against influenza  Plan: flu shot given today.    (F42.9) Obsessive-compulsive disorder, unspecified type  Comment: well-controlled on current regimen.    Plan: TRIAL of decreasing Zoloft from 400 to 350mg daily.    Carin Alvarenga MD   [place] [address] [address] [address]  T: [phone], F: [phone]    (Note was completed, in part, with [employer] voice-recognition software. Documentation was reviewed, but some grammatical, spelling, and word errors may remain.)

## 2021-04-15 ENCOUNTER — TELEPHONE (OUTPATIENT)
Dept: INTERNAL MEDICINE | Facility: CLINIC | Age: 41
End: 2021-04-15

## 2021-04-15 NOTE — TELEPHONE ENCOUNTER
Prior Authorization Retail Medication Request    Medication/Dose: sertraline (ZOLOFT) 100 MG tablet  ICD code (if different than what is on RX):    Previously Tried and Failed:    Rationale:      Insurance Name:    Insurance ID:        Pharmacy Information (if different than what is on RX)  Name:    Phone:

## 2021-04-23 NOTE — TELEPHONE ENCOUNTER
Central Prior Authorization Team   Phone: [phone]      Prior Authorization Approval    Authorization Effective Date: 4/15/2021  Authorization Expiration Date: 4/16/2022  Medication: sertraline (ZOLOFT) 100 MG tablet - APPROVED  Approved Dose/Quantity: 315 FOR 90  Reference #:     Insurance Company: [employer] - Phone [phone] Fax [phone]  Expected CoPay:       CoPay Card Available:      Foundation Assistance Needed:    Which Pharmacy is filling the prescription (Not needed for infusion/clinic administered): [facility] [address]  Pharmacy Notified: Yes  Patient Notified: Yes (**Instructed pharmacy to notify patient when script is ready to /ship.**)

## 2021-04-23 NOTE — TELEPHONE ENCOUNTER
Pharmacy Notified: Yes  Patient Notified: Yes (**Instructed pharmacy to notify patient when script is ready to /ship.**)    NATASHA Henderson-BSN-N  [facility]  [phone]

## 2021-07-01 ENCOUNTER — ALLIED HEALTH/NURSE VISIT (OUTPATIENT)
Dept: NURSING | Facility: CLINIC | Age: 41
End: 2021-07-01
Payer: COMMERCIAL

## 2021-07-01 DIAGNOSIS — Z23 HIGH PRIORITY FOR 2019-NCOV VACCINE: Primary | ICD-10-CM

## 2021-07-01 PROCEDURE — 0001A PR COVID VAC PFIZER DIL RECON 30 MCG/0.3 ML IM: CPT

## 2021-07-01 PROCEDURE — 91300 PR COVID VAC PFIZER DIL RECON 30 MCG/0.3 ML IM: CPT

## 2021-07-22 ENCOUNTER — IMMUNIZATION (OUTPATIENT)
Dept: NURSING | Facility: CLINIC | Age: 41
End: 2021-07-22
Attending: INTERNAL MEDICINE
Payer: COMMERCIAL

## 2021-07-22 PROCEDURE — 0002A PR COVID VAC PFIZER DIL RECON 30 MCG/0.3 ML IM: CPT

## 2021-07-22 PROCEDURE — 91300 PR COVID VAC PFIZER DIL RECON 30 MCG/0.3 ML IM: CPT

## 2021-10-11 ENCOUNTER — OFFICE VISIT (OUTPATIENT)
Dept: INTERNAL MEDICINE | Facility: CLINIC | Age: 41
End: 2021-10-11
Payer: COMMERCIAL

## 2021-10-11 VITALS
HEART RATE: 65 BPM | TEMPERATURE: 97.8 F | SYSTOLIC BLOOD PRESSURE: 116 MMHG | WEIGHT: 208.6 LBS | DIASTOLIC BLOOD PRESSURE: 68 MMHG | OXYGEN SATURATION: 97 % | BODY MASS INDEX: 25.94 KG/M2 | HEIGHT: 75 IN

## 2021-10-11 DIAGNOSIS — F42.9 OBSESSIVE-COMPULSIVE DISORDER, UNSPECIFIED TYPE: ICD-10-CM

## 2021-10-11 DIAGNOSIS — Z23 NEED FOR PROPHYLACTIC VACCINATION AND INOCULATION AGAINST INFLUENZA: ICD-10-CM

## 2021-10-11 DIAGNOSIS — Z13.220 SCREENING FOR CHOLESTEROL LEVEL: ICD-10-CM

## 2021-10-11 DIAGNOSIS — Z00.00 ROUTINE HISTORY AND PHYSICAL EXAMINATION OF ADULT: Primary | ICD-10-CM

## 2021-10-11 DIAGNOSIS — Z13.1 SCREENING FOR DIABETES MELLITUS: ICD-10-CM

## 2021-10-11 PROCEDURE — 90686 IIV4 VACC NO PRSV 0.5 ML IM: CPT | Performed by: INTERNAL MEDICINE

## 2021-10-11 PROCEDURE — 99396 PREV VISIT EST AGE 40-64: CPT | Mod: 25 | Performed by: INTERNAL MEDICINE

## 2021-10-11 PROCEDURE — 90471 IMMUNIZATION ADMIN: CPT | Performed by: INTERNAL MEDICINE

## 2021-10-11 RX ORDER — SERTRALINE HYDROCHLORIDE 100 MG/1
300 TABLET, FILM COATED ORAL DAILY
Qty: 270 TABLET | Refills: 3 | Status: SHIPPED | OUTPATIENT
Start: 2021-10-11 | End: 2022-11-18

## 2021-10-11 ASSESSMENT — MIFFLIN-ST. JEOR: SCORE: 1931.83

## 2021-10-11 NOTE — PROGRESS NOTES
ASSESSMENT/PLAN                                                       (Z00.00) Routine history and physical examination of adult  (primary encounter diagnosis)  Comment: PMH, PSH, FH, SH, medications, allergies, immunizations, and preventative health measures reviewed and updated as appropriate.  Plan: see below for plans.      (Z23) Need for prophylactic vaccination and inoculation against influenza  Plan: flu shot given today.     (Z13.220) Screening for cholesterol level  (Z13.1) Screening for diabetes mellitus  Plan: fasting labs ordered - patient to schedule.     (F42.9) Obsessive-compulsive disorder, unspecified type  Comment: well-controlled on current regimen.    Plan: continue present management; refills provided.     Carin Alvarenga MD   [address] [address]  T: [phone], F: [phone]    SUBJECTIVE                                                      Sean Guzman is a very pleasant 41 year old male who presents for a physical.    ROS:  Constitutional: no unintentional weight loss or gain reported; no fevers, chills, or sweats  reported    Cardiovascular: no chest pain, palpitations, or edema reported  Respiratory: no cough, wheezing, shortness of breath, or dyspnea on exertion reported  Gastrointestinal: no nausea, vomiting, constipation, diarrhea, or abdominal pain reported  Genitourinary: no urinary frequency, urgency, dysuria, or hematuria reported  Integumentary: no rash or pruritus reported  Musculoskeletal: no back pain, muscle pain, joint pain, or joint swelling reported  Neurologic: no focal weakness, numbness, or tingling reported  Hematologic: no easy bruising or bleeding reported  Endocrine: no heat or cold intolerance reported; no polyuria or polydipsia reported  Psychiatric: see PMH below    Past Medical History:   Diagnosis Date     OCD (obsessive compulsive disorder)      Past Surgical History:   Procedure Laterality Date     NO HISTORY  OF SURGERY       Family History   Problem Relation Age of Onset     Hypertension Father      Hyperlipidemia Father      Diabetes Type 2  Maternal Grandmother      Schizophrenia Maternal Grandmother      Chronic Obstructive Pulmonary Disease Maternal Grandfather      Leukemia Paternal Grandmother      Impaired Fasting Glucose Paternal Grandfather      Bladder Cancer Paternal Grandfather         smoker     Prostate Cancer Paternal Grandfather         ?     Colon Cancer Paternal Grandfather         ?     Myocardial Infarction No family hx of      Coronary Artery Disease Early Onset No family hx of      Cerebrovascular Disease No family hx of      Social History     Tobacco Use     Smoking status: Former Smoker     Packs/day: 0.25     Years: 10.00     Pack years: 2.50     Quit date: 2017     Years since quittin.7     Smokeless tobacco: Never Used   Substance and Sexual Activity     Alcohol use: Yes     Comment: rare     Drug use: No     Sexual activity: Not Currently   Social History Narrative    Single.    Occasional exercise.      No Known Allergies     Current Outpatient Medications   Medication Sig     sertraline (ZOLOFT) 100 MG tablet Take 3 tablets (300 mg) by mouth daily     Immunization History   Administered Date(s) Administered     COVID-19,PF,Pfizer 2021, 2021     Influenza Vaccine IM > 6 months Valent IIV4 (Alfuria,Fluzone) 2018, 10/11/2019, 10/09/2020, 10/11/2021     TDAP Vaccine (Adacel) 2015     PREVENTATIVE HEALTH                                                      BMI: overweight  Blood pressure: within normal limits   Prostate CA Screening: not medically indicated at this time   Colon CA screening: not medically indicated at this time   Lung CA screening: patient does not meet screening criteria  AAA screening: not medically indicated at this time   Screening cholesterol: DUE  Screening diabetes: DUE  STD testing: not sexually active  Alcohol misuse screening: alcohol  "use reviewed - no intervention indicated at this time  Immunizations: reviewed; flu shot DUE    OBJECTIVE                                                      /68   Pulse 65   Temp 97.8  F (36.6  C) (Tympanic)   Ht 1.905 m (6' 3\")   Wt 94.6 kg (208 lb 9.6 oz)   SpO2 97%   BMI 26.07 kg/m    Constitutional: well-appearing  Head, Ears, and Eyes: normocephalic; normal external auditory canal and pinna; tympanic membranes visualized and normal; normal lids and conjunctivae  Neck: supple, symmetric, no thyromegaly or lymphadenopathy  Respiratory: normal respiratory effort; clear to auscultation bilaterally  Cardiovascular: regular rate and rhythm; no edema  Gastrointestinal: soft, non-tender, and non-distended; no organomegaly or masses  Musculoskeletal: normal gait and station  Psych: normal judgment and insight; normal mood and affect; recent and remote memory intact    ---    (Note was completed, in part, with [employer] voice-recognition software. Documentation was reviewed, but some grammatical, spelling, and word errors may remain.)    "

## 2021-12-02 NOTE — TELEPHONE ENCOUNTER
Central Prior Authorization Team   Phone: [phone]      PA Initiation    Medication: sertraline (ZOLOFT) 100 MG tablet - INITIATED  Insurance Company: [facility] - Phone [phone] Fax [phone]  Pharmacy Filling the Rx: [address]  Filling Pharmacy Phone: [phone]  Filling Pharmacy Fax: [phone]  Start Date: 4/16/2021         Robert Rubin is a 20year old female presenting with chest pain. Location: left chest and back with tingling down the arms  Quality: aching and tight  Pain Score: 7/10 at the worse with an average of 5  Pattern: continuous, sudden onset and gradual increase  Duration: 5 minutes when it is bad, but he mild pain is always there  Associated signs and symptoms: weakness, deep breath, dizziness, shortness of breath, fatigue, headdache and nausea but the nausea and headache is due to the birth control  Relieving factors: deep breath, lean forward, sitting up and medication she took one baby aspirin at 1100 and one at 1500.     Medication verified and med list updated  Denies known Latex allergy or symptoms of Latex sensitivity  Primary Care Physician verified

## 2022-11-18 ENCOUNTER — OFFICE VISIT (OUTPATIENT)
Dept: INTERNAL MEDICINE | Facility: CLINIC | Age: 42
End: 2022-11-18
Payer: COMMERCIAL

## 2022-11-18 VITALS
HEART RATE: 92 BPM | TEMPERATURE: 98.1 F | BODY MASS INDEX: 26.91 KG/M2 | HEIGHT: 75 IN | DIASTOLIC BLOOD PRESSURE: 66 MMHG | SYSTOLIC BLOOD PRESSURE: 124 MMHG | OXYGEN SATURATION: 97 % | WEIGHT: 216.4 LBS | RESPIRATION RATE: 18 BRPM

## 2022-11-18 DIAGNOSIS — Z23 NEED FOR PROPHYLACTIC VACCINATION AND INOCULATION AGAINST INFLUENZA: ICD-10-CM

## 2022-11-18 DIAGNOSIS — Z23 HIGH PRIORITY FOR 2019-NCOV VACCINE: ICD-10-CM

## 2022-11-18 DIAGNOSIS — Z00.00 ROUTINE HISTORY AND PHYSICAL EXAMINATION OF ADULT: Primary | ICD-10-CM

## 2022-11-18 DIAGNOSIS — F42.9 OBSESSIVE-COMPULSIVE DISORDER, UNSPECIFIED TYPE: ICD-10-CM

## 2022-11-18 PROCEDURE — 99396 PREV VISIT EST AGE 40-64: CPT | Mod: 25 | Performed by: INTERNAL MEDICINE

## 2022-11-18 PROCEDURE — 0124A COVID-19,PF,PFIZER BOOSTER BIVALENT: CPT | Performed by: INTERNAL MEDICINE

## 2022-11-18 PROCEDURE — 90471 IMMUNIZATION ADMIN: CPT | Performed by: INTERNAL MEDICINE

## 2022-11-18 PROCEDURE — 90686 IIV4 VACC NO PRSV 0.5 ML IM: CPT | Performed by: INTERNAL MEDICINE

## 2022-11-18 PROCEDURE — 91312 COVID-19,PF,PFIZER BOOSTER BIVALENT: CPT | Performed by: INTERNAL MEDICINE

## 2022-11-18 RX ORDER — SERTRALINE HYDROCHLORIDE 100 MG/1
300 TABLET, FILM COATED ORAL DAILY
Qty: 270 TABLET | Refills: 3 | Status: SHIPPED | OUTPATIENT
Start: 2022-11-18 | End: 2023-11-21

## 2022-11-18 NOTE — PROGRESS NOTES
ASSESSMENT/PLAN                                                       (Z00.00) Routine history and physical examination of adult  (primary encounter diagnosis)  Comment: PMH, PSH, FH, SH, medications, allergies, immunizations, and preventative health measures reviewed and updated as appropriate.  Plan: see below for plans.      (Z23) Need for prophylactic vaccination and inoculation against influenza  Plan: flu shot given today.     (Z23) High priority for 2019-nCoV vaccine  Plan: COVID-19 booster given today.    (F42.9) Obsessive-compulsive disorder, unspecified type  Comment: well-controlled on current regimen.    Plan: continue present management; refills provided.     Carin Alvarenga MD   [facility]  [address] [address]  T: [phone], F: [phone]    SUBJECTIVE                                                      Sean Guzman is a very pleasant 42 year old male who presents for a physical.    ROS:  Constitutional: no unintentional weight loss or gain reported; no fevers, chills, or sweats  reported    Cardiovascular: no chest pain, palpitations, or edema reported  Respiratory: no cough, wheezing, shortness of breath, or dyspnea on exertion reported  Gastrointestinal: no nausea, vomiting, constipation, diarrhea, or abdominal pain reported  Genitourinary: no urinary frequency, urgency, dysuria, or hematuria reported  Integumentary: no rash or pruritus reported  Musculoskeletal: no back pain, muscle pain, joint pain, or joint swelling reported  Neurologic: no focal weakness, numbness, or tingling reported  Hematologic: no easy bruising or bleeding reported  Endocrine: no heat or cold intolerance reported; no polyuria or polydipsia reported  Psychiatric: see PM below    Past Medical History:   Diagnosis Date     OCD (obsessive compulsive disorder)      Past Surgical History:   Procedure Laterality Date     NO HISTORY OF SURGERY       Family History   Problem Relation Age  of Onset     Hypertension Father      Hyperlipidemia Father      Diabetes Type 2  Maternal Grandmother      Schizophrenia Maternal Grandmother      Chronic Obstructive Pulmonary Disease Maternal Grandfather      Leukemia Paternal Grandmother      Impaired Fasting Glucose Paternal Grandfather      Bladder Cancer Paternal Grandfather         smoker     Prostate Cancer Paternal Grandfather         ?     Colon Cancer Paternal Grandfather         ?     Myocardial Infarction No family hx of      Coronary Artery Disease Early Onset No family hx of      Cerebrovascular Disease No family hx of      Social History     Tobacco Use     Smoking status: Former     Packs/day: 0.25     Years: 10.00     Pack years: 2.50     Types: Cigarettes     Quit date: 2017     Years since quittin.8     Smokeless tobacco: Never   Vaping Use     Vaping Use: Never used   Substance and Sexual Activity     Alcohol use: Yes     Comment: rare     Drug use: No     Sexual activity: Not Currently   Social History Narrative    Single.    Occasional exercise.      No Known Allergies     Current Outpatient Medications   Medication Sig     sertraline (ZOLOFT) 100 MG tablet Take 3 tablets (300 mg) by mouth daily     Immunization History   Administered Date(s) Administered     COVID-charity: water,,Pfizer (12+ Yrs) 2021, 2021     Influenza Vaccine IM > 6 months Valent IIV4 (Alfuria,Fluzone) 2018, 10/11/2019, 10/09/2020, 10/11/2021     TDAP Vaccine (Adacel) 2015     PREVENTATIVE HEALTH                                                      BMI: overweight  Blood pressure: within normal limits   Prostate CA Screening: not medically indicated at this time   Colon CA screening: not medically indicated at this time   Lung CA screening: patient does not meet screening criteria  AAA screening: not medically indicated at this time   Screening cholesterol: not medically indicated at this time   Screening diabetes: not medically indicated at this  "time   STD testing: not sexually active  Alcohol misuse screening: alcohol use reviewed - no intervention indicated at this time  Immunizations: reviewed; flu shot and COVID-19 booster DUE    OBJECTIVE                                                      /66   Pulse 92   Temp 98.1  F (36.7  C) (Tympanic)   Resp 18   Ht 1.892 m (6' 2.5\")   Wt 98.2 kg (216 lb 6.4 oz)   SpO2 97%   BMI 27.41 kg/m    Constitutional: well-appearing  Head, Ears, and Eyes: normocephalic; normal external auditory canal and pinna; tympanic membranes visualized and normal; normal lids and conjunctivae  Neck: supple, symmetric, no thyromegaly or lymphadenopathy  Respiratory: normal respiratory effort; clear to auscultation bilaterally  Cardiovascular: regular rate and rhythm; no edema  Gastrointestinal: soft, non-tender, and non-distended; no organomegaly or masses  Musculoskeletal: normal gait and station  Psych: normal judgment and insight; normal mood and affect; recent and remote memory intact    ---    (Note was completed, in part, with [employer] voice-recognition software. Documentation was reviewed, but some grammatical, spelling, and word errors may remain.)    "

## 2022-11-20 ENCOUNTER — TELEPHONE (OUTPATIENT)
Dept: INTERNAL MEDICINE | Facility: CLINIC | Age: 42
End: 2022-11-20

## 2022-11-20 NOTE — TELEPHONE ENCOUNTER
Prior Authorization Retail Medication Request    Medication/Dose: Sertraline  ICD code (if different than what is on RX):    Previously Tried and Failed:    Rationale:  Insurance doesn't cover 3 tablets per day, needs prior authorization renewal    Insurance Name:  [facility]  Insurance ID:  [id number]      Pharmacy Information (if different than what is on RX)  Name:  [facility]  Phone:  [phone]    Demarcus Ley, PharmD  [facility]  [phone]

## 2022-11-22 NOTE — TELEPHONE ENCOUNTER
Prior Authorization Approval    Authorization Effective Date: 11/21/2022  Authorization Expiration Date: 11/22/2023  Medication: sertraline (ZOLOFT) 100 MG tablet - [place] APPROVED  Insurance Company: [employer] - Phone [phone] Fax [phone]  Which Pharmacy is filling the prescription (Not needed for infusion/clinic administered): [facility] [address]  Pharmacy Notified: Yes  Patient Notified: Yes (pharmacy will notify patient when ready)

## 2022-11-22 NOTE — TELEPHONE ENCOUNTER
Central Prior Authorization Team   Phone: [phone]      PA Initiation    Medication: sertraline (ZOLOFT) 100 MG tablet - INITIATED  Insurance Company: [employer] - Phone [phone] Fax [phone]  Pharmacy Filling the Rx: [address]  Filling Pharmacy Phone: [phone]  Filling Pharmacy Fax:    Start Date: 11/22/2022

## 2023-11-21 ENCOUNTER — OFFICE VISIT (OUTPATIENT)
Dept: INTERNAL MEDICINE | Facility: CLINIC | Age: 43
End: 2023-11-21
Payer: COMMERCIAL

## 2023-11-21 VITALS
TEMPERATURE: 96.1 F | RESPIRATION RATE: 16 BRPM | DIASTOLIC BLOOD PRESSURE: 70 MMHG | HEART RATE: 87 BPM | WEIGHT: 211.1 LBS | SYSTOLIC BLOOD PRESSURE: 100 MMHG | OXYGEN SATURATION: 96 % | HEIGHT: 75 IN | BODY MASS INDEX: 26.25 KG/M2

## 2023-11-21 DIAGNOSIS — Z23 NEED FOR PROPHYLACTIC VACCINATION AND INOCULATION AGAINST INFLUENZA: ICD-10-CM

## 2023-11-21 DIAGNOSIS — Z23 HIGH PRIORITY FOR 2019-NCOV VACCINE: ICD-10-CM

## 2023-11-21 DIAGNOSIS — F41.1 GAD (GENERALIZED ANXIETY DISORDER): Primary | ICD-10-CM

## 2023-11-21 DIAGNOSIS — F42.9 OBSESSIVE-COMPULSIVE DISORDER, UNSPECIFIED TYPE: ICD-10-CM

## 2023-11-21 PROCEDURE — 90471 IMMUNIZATION ADMIN: CPT | Performed by: INTERNAL MEDICINE

## 2023-11-21 PROCEDURE — 91320 SARSCV2 VAC 30MCG TRS-SUC IM: CPT | Performed by: INTERNAL MEDICINE

## 2023-11-21 PROCEDURE — 90480 ADMN SARSCOV2 VAC 1/ONLY CMP: CPT | Performed by: INTERNAL MEDICINE

## 2023-11-21 PROCEDURE — 90686 IIV4 VACC NO PRSV 0.5 ML IM: CPT | Performed by: INTERNAL MEDICINE

## 2023-11-21 PROCEDURE — 99214 OFFICE O/P EST MOD 30 MIN: CPT | Mod: 25 | Performed by: INTERNAL MEDICINE

## 2023-11-21 RX ORDER — SERTRALINE HYDROCHLORIDE 100 MG/1
TABLET, FILM COATED ORAL
Qty: 113 TABLET | Refills: 0 | Status: SHIPPED | OUTPATIENT
Start: 2023-11-21 | End: 2024-01-31

## 2023-11-21 RX ORDER — PAROXETINE 20 MG/1
TABLET, FILM COATED ORAL
Qty: 83 TABLET | Refills: 0 | Status: SHIPPED | OUTPATIENT
Start: 2023-11-21 | End: 2024-01-31

## 2023-11-21 ASSESSMENT — ANXIETY QUESTIONNAIRES
7. FEELING AFRAID AS IF SOMETHING AWFUL MIGHT HAPPEN: SEVERAL DAYS
1. FEELING NERVOUS, ANXIOUS, OR ON EDGE: SEVERAL DAYS
6. BECOMING EASILY ANNOYED OR IRRITABLE: SEVERAL DAYS
IF YOU CHECKED OFF ANY PROBLEMS ON THIS QUESTIONNAIRE, HOW DIFFICULT HAVE THESE PROBLEMS MADE IT FOR YOU TO DO YOUR WORK, TAKE CARE OF THINGS AT HOME, OR GET ALONG WITH OTHER PEOPLE: SOMEWHAT DIFFICULT
5. BEING SO RESTLESS THAT IT IS HARD TO SIT STILL: SEVERAL DAYS
GAD7 TOTAL SCORE: 7
4. TROUBLE RELAXING: SEVERAL DAYS
3. WORRYING TOO MUCH ABOUT DIFFERENT THINGS: SEVERAL DAYS
GAD7 TOTAL SCORE: 7
2. NOT BEING ABLE TO STOP OR CONTROL WORRYING: SEVERAL DAYS

## 2023-11-21 NOTE — COMMUNITY RESOURCES LIST (ENGLISH)
11/21/2023   [facility] - Outpatient Clinics  N/A  For additional resource needs, please contact your health insurance member services or your primary care team.  Phone: [phone]   Email: N/A   Address: [facility] [place] [address]   Hours: N/A        Financial Stability       Rent and mortgage payment assistance  1  [facility] Distance: 1.12 miles      In-Person, Phone/Virtual   [address] [place] [address]  Language: English, Tajik  Hours: Mon - Fri 9:00 AM - 4:30 PM  Fees: Free   Phone: (152) 304-3834 Ext.113 Email: info@[email or web address] Website: [email or web address]     2  [facility] Rent and mortgage payment assistance Distance: 5.87 miles      In-Person, Phone/Virtual   [address] [place] [address]  Language: English  Hours: Mon - Thu 9:00 AM - 3:00 PM  Fees: Free   Phone: (503) 979-2902 Email: [email or web address]@[email or web address] Website: http://www.[email or web address]/care-ministries/          Important Numbers & Websites       [facility]   [address] 211itedway.org  Poison Control   (731) 165-1989 [place]po[place]  Suicide and Crisis Lifeline   [address] [place], [place] [address]line.org  Childhelp [place] Child Abuse Hotline   451.295.3143 Childhelphotline.org  National Sexual Assault Hotline   (199) 986-4528 (HOPE) Rainn.org  National Runaway Safeline   (386) 376-7021 (RUNAWAY) [facility].org  Pregnancy & Postpartum Support [place]   Call/text 807-308-8609 Ppsupportmn.org  Substance Abuse National Helpline ([facility]   274-469-HELP (2757) Findtreatment.gov  Emergency Services   911

## 2023-11-21 NOTE — COMMUNITY RESOURCES LIST (ENGLISH)
11/21/2023   [facility] [employer]  N/A  For questions about this resource list or additional care needs, please contact your primary care clinic or care manager.  Phone: [phone]   Email: N/A   Address: [address] [place] [address]   Hours: N/A        Financial Stability       Rent and mortgage payment assistance  1  [facility] Distance: 1.12 miles      In-Person, Phone/Virtual   [address] [place] [address]  Language: English, Citizen of [place]  Hours: Mon - Fri 9:00 AM - 4:30 PM  Fees: Free   Phone: (801) 330-1239 Ext.113 Email: info@[facility].Kash Website: [email or web address]     2  [facility] Rent and mortgage payment assistance Distance: 5.87 miles      In-Person, Phone/Virtual   [address] [place] [address]  Language: English  Hours: Mon - Thu 9:00 AM - 3:00 PM  Fees: Free   Phone: (340) 351-7152 Email: Yarsani@[email or web address].org Website: http://www.[facility].org/care-ministries/          Important Numbers & Websites       Emergency Services   911  City Services   311  Poison Control   (776) 891-7490  Suicide Prevention Lifeline   (209) 663-6111 (TALK)  Child Abuse Hotline   (217) 847-8960 (4-A-Child)  Sexual Assault Hotline   (895) 702-1695 (HOPE)  National Runaway Safeline   (488) 143-8663 (RUNAWAY)  All-Options Talkline   (498) 340-3578  Substance Abuse Referral   (241) 377-9281 (HELP)

## 2023-11-21 NOTE — PROGRESS NOTES
ASSESSMENT/PLAN                                                       (F41.1) SURAJ (generalized anxiety disorder)  (primary encounter diagnosis)  (F42.9) Obsessive-compulsive disorder, unspecified type  Comment: anxiety poorly controlled on Zoloft 300 mg daily.  Plan:    - Paxil 10 then 20mg nightly (nighttime anxiety is most prominent).   - WEAN OFF of Zoloft over the next 4 months.   - mood follow-up in 3 months (earlier as needed).     (Z23) Need for prophylactic vaccination and inoculation against influenza  Plan: Flu shot given today.    (Z23) High priority for 2019-nCoV vaccine  Comment: COVID-19 booster given today.    Carin Alvarenga MD   [facility] [place]  [address] [place] [id number]  T: [phone], F: [phone]    JASWINDER Guzman is a very pleasant 43 year old male who presents for health history and medication review:    Patient complains of poorly controlled anxiety.  He is currently on Zoloft 300 mg daily for OCD.  He suspects that anxiety is more of an issue for him than OCD.  He has been on Prozac and Paxil in the past for mood/OCD and believes he tolerated both of these.  He would be interested in switching from Zoloft to Paxil, as Paxil may better address his anxiety.    PMH, PSH, FH, SH, medications, allergies, immunizations, and preventative health measures reviewed and updated as appropriate.    Past Medical History:   Diagnosis Date    OCD (obsessive compulsive disorder)      Past Surgical History:   Procedure Laterality Date    NO HISTORY OF SURGERY       Family History   Problem Relation Age of Onset    Hypertension Father     Hyperlipidemia Father     Diabetes Type 2  Maternal Grandmother     Schizophrenia Maternal Grandmother     Chronic Obstructive Pulmonary Disease Maternal Grandfather     Leukemia Paternal Grandmother     Impaired Fasting Glucose Paternal Grandfather     Bladder Cancer Paternal  Grandfather         smoker    Prostate Cancer Paternal Grandfather         ?    Colon Cancer Paternal Grandfather         ?    Myocardial Infarction No family hx of     Coronary Artery Disease Early Onset No family hx of     Cerebrovascular Disease No family hx of      Social History     Tobacco Use    Smoking status: Former     Packs/day: 0.25     Years: 10.00     Additional pack years: 0.00     Total pack years: 2.50     Types: Cigarettes     Quit date: 2017     Years since quittin.8    Smokeless tobacco: Never   Vaping Use    Vaping Use: Never used   Substance and Sexual Activity    Alcohol use: Yes     Comment: rare    Drug use: No    Sexual activity: Not Currently   Social History Narrative    Single.    Occasional exercise.      No Known Allergies    Current Outpatient Medications   Medication Sig    PARoxetine (PAXIL) 20 MG tablet Take 0.5 tablets (10 mg) by mouth every evening for 14 days, THEN 1 tablet (20 mg) every evening for 76 days.    sertraline (ZOLOFT) 100 MG tablet Take 2 tablets (200 mg) by mouth daily for 30 days, THEN 1 tablet (100 mg) daily for 30 days, THEN 0.5 tablets (50 mg) daily for 30 days, THEN 0.5 tablets (50 mg) every other day for 30 days.     Immunization History   Administered Date(s) Administered    COVID-19 12+ () (Pfizer) 2023    COVID-19 Bivalent 12+ (Pfizer) 2022    COVID-19 MONOVALENT 12+ (Pfizer) 2021, 2021    Historical DTP/aP 1979, 1979, 1979    Influenza Vaccine >6 months,quad, PF 2018, 10/11/2019, 10/09/2020, 10/11/2021, 2022, 2023    MMR 1980    OPV, trivalent, live 1979, 1979    TDAP Vaccine (Adacel) 2015    Td (Adult), Adsorbed 2006     PREVENTATIVE HEALTH                                                      BMI: overweight  Blood pressure: within normal limits   Prostate CA Screening: not medically indicated at this time   Colon CA screening: not medically indicated  "at this time   Lung CA screening: patient does not meet screening criteria  AAA screening: patient does not meet screening criteria  Screening cholesterol: not medically indicated at this time   Screening diabetes: not medically indicated at this time   STD testing: not sexually active  Alcohol misuse screening: alcohol use reviewed - no intervention indicated at this time  Immunizations: reviewed;  flu shot and COVID-19 booster DUE    OBJECTIVE                                                      /70 (BP Location: Left arm, Patient Position: Sitting, Cuff Size: Adult Regular)   Pulse 87   Temp (!) 96.1  F (35.6  C) (Temporal)   Resp 16   Ht 1.892 m (6' 2.5\")   Wt 95.8 kg (211 lb 1.6 oz)   SpO2 96%   BMI 26.74 kg/m    Constitutional: well-appearing  Psych: normal judgment and insight; anxious mood and affect; recent and remote memory intact    ---    (Note was completed, in part, with [employer] voice-recognition software. Documentation was reviewed, but some grammatical, spelling, and word errors may remain.)    "

## 2024-01-31 DIAGNOSIS — F42.9 OBSESSIVE-COMPULSIVE DISORDER, UNSPECIFIED TYPE: ICD-10-CM

## 2024-01-31 RX ORDER — PAROXETINE 20 MG/1
TABLET, FILM COATED ORAL
Qty: 83 TABLET | Refills: 0 | Status: SHIPPED | OUTPATIENT
Start: 2024-01-31 | End: 2024-02-22

## 2024-01-31 RX ORDER — SERTRALINE HYDROCHLORIDE 100 MG/1
TABLET, FILM COATED ORAL
Qty: 113 TABLET | Refills: 0 | Status: SHIPPED | OUTPATIENT
Start: 2024-01-31

## 2024-02-22 ENCOUNTER — OFFICE VISIT (OUTPATIENT)
Dept: INTERNAL MEDICINE | Facility: CLINIC | Age: 44
End: 2024-02-22
Payer: COMMERCIAL

## 2024-02-22 VITALS
DIASTOLIC BLOOD PRESSURE: 70 MMHG | BODY MASS INDEX: 26.97 KG/M2 | OXYGEN SATURATION: 99 % | SYSTOLIC BLOOD PRESSURE: 110 MMHG | TEMPERATURE: 96.9 F | WEIGHT: 212.9 LBS | HEART RATE: 90 BPM

## 2024-02-22 DIAGNOSIS — F41.1 GAD (GENERALIZED ANXIETY DISORDER): Primary | ICD-10-CM

## 2024-02-22 DIAGNOSIS — F42.9 OBSESSIVE-COMPULSIVE DISORDER, UNSPECIFIED TYPE: ICD-10-CM

## 2024-02-22 PROCEDURE — 99213 OFFICE O/P EST LOW 20 MIN: CPT | Performed by: INTERNAL MEDICINE

## 2024-02-22 RX ORDER — PAROXETINE 10 MG/1
10 TABLET, FILM COATED ORAL AT BEDTIME
Qty: 90 TABLET | Refills: 3 | Status: SHIPPED | OUTPATIENT
Start: 2024-02-22

## 2024-02-22 ASSESSMENT — ANXIETY QUESTIONNAIRES
IF YOU CHECKED OFF ANY PROBLEMS ON THIS QUESTIONNAIRE, HOW DIFFICULT HAVE THESE PROBLEMS MADE IT FOR YOU TO DO YOUR WORK, TAKE CARE OF THINGS AT HOME, OR GET ALONG WITH OTHER PEOPLE: SOMEWHAT DIFFICULT
8. IF YOU CHECKED OFF ANY PROBLEMS, HOW DIFFICULT HAVE THESE MADE IT FOR YOU TO DO YOUR WORK, TAKE CARE OF THINGS AT HOME, OR GET ALONG WITH OTHER PEOPLE?: SOMEWHAT DIFFICULT
6. BECOMING EASILY ANNOYED OR IRRITABLE: NOT AT ALL
3. WORRYING TOO MUCH ABOUT DIFFERENT THINGS: SEVERAL DAYS
2. NOT BEING ABLE TO STOP OR CONTROL WORRYING: SEVERAL DAYS
GAD7 TOTAL SCORE: 5
GAD7 TOTAL SCORE: 5
5. BEING SO RESTLESS THAT IT IS HARD TO SIT STILL: SEVERAL DAYS
7. FEELING AFRAID AS IF SOMETHING AWFUL MIGHT HAPPEN: NOT AT ALL
7. FEELING AFRAID AS IF SOMETHING AWFUL MIGHT HAPPEN: NOT AT ALL
1. FEELING NERVOUS, ANXIOUS, OR ON EDGE: SEVERAL DAYS
GAD7 TOTAL SCORE: 5
4. TROUBLE RELAXING: SEVERAL DAYS

## 2024-02-22 NOTE — PROGRESS NOTES
ASSESSMENT/PLAN                                                      (F41.1) SURAJ (generalized anxiety disorder)  (primary encounter diagnosis)  (F42.9) Obsessive-compulsive disorder, unspecified type  Comment: well-controlled on current regimen.    Plan: continue and complete Zoloft taper as previously prescribed; DECREASE Paxil from 20 to 10 mg nightly; if mood symptoms worsen, change, or do not improve, patient to contact MD.      Carin Alvarenga MD   [address] [address]  T: [phone], F: [phone]    SUBJECTIVE                                                      Sean Guzman is a very pleasant 43 year old male who presents for follow-up:    Patient was tapered off of Zoloft and started on Paxil at last visit due to poorly controlled anxiety.  Tolerating Paxil well-no adverse side effects.  Patient continues to wean off of Zoloft without difficulty. Patient reports that his anxiety and OCD are well-controlled on Paxil and he would actually like to try taking a lower dose of Paxil to see how he does.    OBJECTIVE                                                      /70   Pulse 90   Temp 96.9  F (36.1  C)   Wt 96.6 kg (212 lb 14.4 oz)   SpO2 99%   BMI 26.97 kg/m    Constitutional: well-appearing  Psych: normal judgment and insight; normal mood and affect; recent and remote memory intact    ---    (Note documentation was completed, in part, with [email or web address] voice-recognition software. Documentation was reviewed, but some grammatical, spelling, and word errors may remain.)

## 2024-05-03 DIAGNOSIS — F42.9 OBSESSIVE-COMPULSIVE DISORDER, UNSPECIFIED TYPE: ICD-10-CM

## 2024-05-03 RX ORDER — SERTRALINE HYDROCHLORIDE 100 MG/1
TABLET, FILM COATED ORAL
Qty: 113 TABLET | Refills: 0 | OUTPATIENT
Start: 2024-05-03

## 2024-05-03 NOTE — TELEPHONE ENCOUNTER
Triage,    Please rikki the patient: is patient needing a refill of this medication? It looks like patient may be tapering off the medication?    Thank you,  aKrly Goodman RN

## 2024-05-03 NOTE — TELEPHONE ENCOUNTER
Called and spoke with pt.   He states he is not needing a refill of this medication, as he is tapering off of it.  Closing encounter.  Thank you,  Valerie Fair RN

## 2024-11-25 ENCOUNTER — OFFICE VISIT (OUTPATIENT)
Dept: INTERNAL MEDICINE | Facility: CLINIC | Age: 44
End: 2024-11-25
Payer: COMMERCIAL

## 2024-11-25 VITALS
HEART RATE: 88 BPM | BODY MASS INDEX: 24.75 KG/M2 | WEIGHT: 195.4 LBS | OXYGEN SATURATION: 98 % | SYSTOLIC BLOOD PRESSURE: 114 MMHG | TEMPERATURE: 97.4 F | DIASTOLIC BLOOD PRESSURE: 68 MMHG

## 2024-11-25 DIAGNOSIS — Z23 HIGH PRIORITY FOR 2019-NCOV VACCINE: ICD-10-CM

## 2024-11-25 DIAGNOSIS — F41.1 GAD (GENERALIZED ANXIETY DISORDER): ICD-10-CM

## 2024-11-25 DIAGNOSIS — F42.9 OBSESSIVE-COMPULSIVE DISORDER, UNSPECIFIED TYPE: ICD-10-CM

## 2024-11-25 DIAGNOSIS — Z00.00 ROUTINE HISTORY AND PHYSICAL EXAMINATION OF ADULT: Primary | ICD-10-CM

## 2024-11-25 DIAGNOSIS — Z13.1 SCREENING FOR DIABETES MELLITUS: ICD-10-CM

## 2024-11-25 DIAGNOSIS — Z13.220 SCREENING FOR CHOLESTEROL LEVEL: ICD-10-CM

## 2024-11-25 DIAGNOSIS — Z12.11 SPECIAL SCREENING FOR MALIGNANT NEOPLASMS, COLON: ICD-10-CM

## 2024-11-25 DIAGNOSIS — Z23 NEED FOR PROPHYLACTIC VACCINATION AND INOCULATION AGAINST INFLUENZA: ICD-10-CM

## 2024-11-25 LAB
ALBUMIN SERPL BCG-MCNC: 4.7 G/DL (ref 3.5–5.2)
ALP SERPL-CCNC: 105 U/L (ref 40–150)
ALT SERPL W P-5'-P-CCNC: 48 U/L (ref 0–70)
ANION GAP SERPL CALCULATED.3IONS-SCNC: 12 MMOL/L (ref 7–15)
AST SERPL W P-5'-P-CCNC: 60 U/L (ref 0–45)
BILIRUB SERPL-MCNC: 0.4 MG/DL
BUN SERPL-MCNC: 12.6 MG/DL (ref 6–20)
CALCIUM SERPL-MCNC: 10 MG/DL (ref 8.8–10.4)
CHLORIDE SERPL-SCNC: 99 MMOL/L (ref 98–107)
CHOLEST SERPL-MCNC: 216 MG/DL
CREAT SERPL-MCNC: 0.92 MG/DL (ref 0.67–1.17)
EGFRCR SERPLBLD CKD-EPI 2021: >90 ML/MIN/1.73M2
FASTING STATUS PATIENT QL REPORTED: NO
FASTING STATUS PATIENT QL REPORTED: NO
GLUCOSE SERPL-MCNC: 92 MG/DL (ref 70–99)
HCO3 SERPL-SCNC: 27 MMOL/L (ref 22–29)
HDLC SERPL-MCNC: 47 MG/DL
LDLC SERPL CALC-MCNC: 144 MG/DL
NONHDLC SERPL-MCNC: 169 MG/DL
POTASSIUM SERPL-SCNC: 4.2 MMOL/L (ref 3.4–5.3)
PROT SERPL-MCNC: 7.5 G/DL (ref 6.4–8.3)
SODIUM SERPL-SCNC: 138 MMOL/L (ref 135–145)
TRIGL SERPL-MCNC: 126 MG/DL

## 2024-11-25 PROCEDURE — 80053 COMPREHEN METABOLIC PANEL: CPT | Performed by: INTERNAL MEDICINE

## 2024-11-25 PROCEDURE — 99396 PREV VISIT EST AGE 40-64: CPT | Mod: 25 | Performed by: INTERNAL MEDICINE

## 2024-11-25 PROCEDURE — 90656 IIV3 VACC NO PRSV 0.5 ML IM: CPT | Performed by: INTERNAL MEDICINE

## 2024-11-25 PROCEDURE — 90471 IMMUNIZATION ADMIN: CPT | Performed by: INTERNAL MEDICINE

## 2024-11-25 PROCEDURE — 91320 SARSCV2 VAC 30MCG TRS-SUC IM: CPT | Performed by: INTERNAL MEDICINE

## 2024-11-25 PROCEDURE — 36415 COLL VENOUS BLD VENIPUNCTURE: CPT | Performed by: INTERNAL MEDICINE

## 2024-11-25 PROCEDURE — 80061 LIPID PANEL: CPT | Performed by: INTERNAL MEDICINE

## 2024-11-25 PROCEDURE — 90480 ADMN SARSCOV2 VAC 1/ONLY CMP: CPT | Performed by: INTERNAL MEDICINE

## 2024-11-25 RX ORDER — PAROXETINE 10 MG/1
10 TABLET, FILM COATED ORAL AT BEDTIME
Qty: 90 TABLET | Refills: 3 | Status: SHIPPED | OUTPATIENT
Start: 2024-11-25

## 2024-11-25 ASSESSMENT — ANXIETY QUESTIONNAIRES
7. FEELING AFRAID AS IF SOMETHING AWFUL MIGHT HAPPEN: SEVERAL DAYS
4. TROUBLE RELAXING: SEVERAL DAYS
7. FEELING AFRAID AS IF SOMETHING AWFUL MIGHT HAPPEN: SEVERAL DAYS
GAD7 TOTAL SCORE: 7
GAD7 TOTAL SCORE: 7
5. BEING SO RESTLESS THAT IT IS HARD TO SIT STILL: SEVERAL DAYS
2. NOT BEING ABLE TO STOP OR CONTROL WORRYING: SEVERAL DAYS
IF YOU CHECKED OFF ANY PROBLEMS ON THIS QUESTIONNAIRE, HOW DIFFICULT HAVE THESE PROBLEMS MADE IT FOR YOU TO DO YOUR WORK, TAKE CARE OF THINGS AT HOME, OR GET ALONG WITH OTHER PEOPLE: SOMEWHAT DIFFICULT
6. BECOMING EASILY ANNOYED OR IRRITABLE: SEVERAL DAYS
3. WORRYING TOO MUCH ABOUT DIFFERENT THINGS: SEVERAL DAYS
8. IF YOU CHECKED OFF ANY PROBLEMS, HOW DIFFICULT HAVE THESE MADE IT FOR YOU TO DO YOUR WORK, TAKE CARE OF THINGS AT HOME, OR GET ALONG WITH OTHER PEOPLE?: SOMEWHAT DIFFICULT
1. FEELING NERVOUS, ANXIOUS, OR ON EDGE: SEVERAL DAYS
GAD7 TOTAL SCORE: 7

## 2024-11-25 NOTE — LETTER
November 25, 2024      Sean Guzman  [address]  [facility] [address]        Dear ,    We are writing to inform you of your test results.    Your cholesterol is a bit elevated. Medication is not needed, but attention to a heart healthy diet, regular exercise, and maintaining a healthy weight are recommended and encouraged.    We will continue to monitor this annually.     The remainder of your labs are within or near normal limits.    Resulted Orders   Comprehensive metabolic panel   Result Value Ref Range    Sodium 138 135 - 145 mmol/L    Potassium 4.2 3.4 - 5.3 mmol/L    Carbon Dioxide (CO2) 27 22 - 29 mmol/L    Anion Gap 12 7 - 15 mmol/L    Urea Nitrogen 12.6 6.0 - 20.0 mg/dL    Creatinine 0.92 0.67 - 1.17 mg/dL    GFR Estimate >90 >60 mL/min/1.73m2    Calcium 10.0 8.8 - 10.4 mg/dL    Chloride 99 98 - 107 mmol/L    Glucose 92 70 - 99 mg/dL    Alkaline Phosphatase 105 40 - 150 U/L    AST 60 (H) 0 - 45 U/L    ALT 48 0 - 70 U/L    Protein Total 7.5 6.4 - 8.3 g/dL    Albumin 4.7 3.5 - 5.2 g/dL    Bilirubin Total 0.4 <=1.2 mg/dL    Patient Fasting > 8hrs? No    Lipid Profile   Result Value Ref Range    Cholesterol 216 (H) <200 mg/dL    Triglycerides 126 <150 mg/dL    Direct Measure HDL 47 >=40 mg/dL    LDL Cholesterol Calculated 144 (H) <100 mg/dL    Non HDL Cholesterol 169 (H) <130 mg/dL    Patient Fasting > 8hrs? No      If you have any questions or concerns, please call the clinic at the number listed above.       Sincerely,      Carin Alvarenga MD

## 2024-11-25 NOTE — PROGRESS NOTES
ASSESSMENT/PLAN                                                       (Z00.00) Routine history and physical examination of adult  (primary encounter diagnosis)  Comment: PMH, PSH, FH, SH, medications, allergies, immunizations, and preventative health measures reviewed and updated as appropriate.  Plan: see below for plans.      (F41.1) SURAJ (generalized anxiety disorder)  (F42.9) Obsessive-compulsive disorder, unspecified type  Comment: well-controlled on Paxil  Plan: continue present management; refills provided.     (Z23) Need for prophylactic vaccination and inoculation against influenza  Comment: flu shot given today.    (Z23) High priority for 2019-nCoV vaccine  Comment: COVID-19 booster given today.    (Z13.220) Screening for cholesterol level  (Z13.1) Screening for diabetes mellitus  Plan: fasting labs today.     (Z12.11) Special screening for malignant neoplasms, colon  Plan: FIT test ordered - patient to pick-up, complete, and mail in when able.     Carin Alvarenga MD   [address] [address]  T: [phone], F: [phone]    SUBJECTIVE                                                      Sean Guzman is a very pleasant 44 year old male who presents for a physical.    ROS:  Constitutional: no unintentional weight loss or gain reported; no fevers, chills, or sweats  reported    Cardiovascular: no chest pain, palpitations, or edema reported  Respiratory: no cough, wheezing, shortness of breath, or dyspnea on exertion reported  Gastrointestinal: no nausea, vomiting, constipation, diarrhea, or abdominal pain reported  Genitourinary: no urinary frequency, urgency, dysuria, or hematuria reported  Integumentary: no rash or pruritus reported  Musculoskeletal: no back pain, muscle pain, joint pain, or joint swelling reported  Neurologic: no focal weakness, numbness, or tingling reported  Hematologic: no easy bruising or bleeding reported  Endocrine: no heat or cold  intolerance reported; no polyuria or polydipsia reported  Psychiatric: no anxiety or depression reported    Past Medical History:   Diagnosis Date    OCD (obsessive compulsive disorder)      Past Surgical History:   Procedure Laterality Date    NO HISTORY OF SURGERY       Family History   Problem Relation Age of Onset    Hypertension Father     Hyperlipidemia Father     Diabetes Type 2  Maternal Grandmother     Schizophrenia Maternal Grandmother     Chronic Obstructive Pulmonary Disease Maternal Grandfather     Leukemia Paternal Grandmother     Impaired Fasting Glucose Paternal Grandfather     Bladder Cancer Paternal Grandfather         smoker    Prostate Cancer Paternal Grandfather         ?    Colon Cancer Paternal Grandfather         ?    Myocardial Infarction No family hx of     Coronary Artery Disease Early Onset No family hx of     Cerebrovascular Disease No family hx of      Social History     Tobacco Use    Smoking status: Former     Types: Cigarettes    Smokeless tobacco: Never    Tobacco comments:     Quit in 2017; smoked for 10 years; 0.25 ppd at his most.    Vaping Use    Vaping status: Never Used   Substance and Sexual Activity    Alcohol use: Yes     Comment: rare    Drug use: No    Sexual activity: Not Currently   Social History Narrative    Single.    Occasional exercise.      No Known Allergies    Current Outpatient Medications   Medication Sig Dispense Refill    PARoxetine (PAXIL) 10 MG tablet Take 1 tablet (10 mg) by mouth at bedtime. 90 tablet 3     Immunization History   Administered Date(s) Administered    COVID-19 12+ (Pfizer) 11/21/2023    COVID-19 Bivalent 12+ (Pfizer) 11/18/2022    COVID-19 MONOVALENT 12+ (Pfizer) 07/01/2021, 07/22/2021    Historical DTP/aP 1979, 1979, 1979    Influenza Vaccine >6 months,quad, PF 11/16/2018, 10/11/2019, 10/09/2020, 10/11/2021, 11/18/2022, 11/21/2023    MMR 09/26/1980    OPV, trivalent, live 1979, 1979    TDAP Vaccine (Adacel)  11/03/2015    Td (Adult), Adsorbed 01/09/2006     PREVENTATIVE HEALTH                                                      BMI: within normal limits   Blood pressure: within normal limits   Prostate CA screening: not medically indicated at this time   Colon CA screening: DUE  Lung CA screening: patient does not meet screening criteria  AAA screening: patient does not meet screening criteria  Screening cholesterol: DUE  Screening diabetes: DUE  STD testing: not sexually active  Alcohol misuse screening: alcohol use reviewed - no intervention indicated at this time  Immunizations: reviewed;  COVID-19 booster and flu shot DUE    OBJECTIVE                                                      /68   Pulse 88   Temp 97.4  F (36.3  C) (Temporal)   Wt 88.6 kg (195 lb 6.4 oz)   SpO2 98%   BMI 24.75 kg/m    Constitutional: well-appearing  Head, Ears, and Eyes: normocephalic; normal external auditory canal and pinna; tympanic membranes visualized and normal; normal lids and conjunctivae  Neck: supple, symmetric, no thyromegaly or lymphadenopathy  Respiratory: normal respiratory effort; clear to auscultation bilaterally  Cardiovascular: regular rate and rhythm; no edema  Gastrointestinal: soft, non-tender, and non-distended; no organomegaly or masses  Musculoskeletal: normal gait and station  Psych: normal judgment and insight; normal mood and affect; recent and remote memory intact    ---  (Note was completed, in part, with [email or web address] voice-recognition software. Documentation was reviewed, but some grammatical, spelling, and word errors may remain.)